# Patient Record
Sex: FEMALE | Race: WHITE | NOT HISPANIC OR LATINO | Employment: UNEMPLOYED | URBAN - METROPOLITAN AREA
[De-identification: names, ages, dates, MRNs, and addresses within clinical notes are randomized per-mention and may not be internally consistent; named-entity substitution may affect disease eponyms.]

---

## 2017-03-27 ENCOUNTER — TELEPHONE (OUTPATIENT)
Dept: FAMILY MEDICINE | Facility: OTHER | Age: 37
End: 2017-03-27

## 2017-03-27 NOTE — TELEPHONE ENCOUNTER
Summary:    Patient is due/failing the following:   FOLLOW UP, PAP and PHQ9    Action needed:   Patient needs office visit for follow up and PAP. and Patient needs to do PHQ9.    Type of outreach:    Phone, left message for patient to call back.     Questions for provider review:    None                                                                                                                                    Marima Purcell       Chart routed to Care Team .        Panel Management Review      Patient has the following on her problem list:     Depression / Dysthymia review  PHQ-9 SCORE 10/15/2013 1/9/2014 4/25/2016   Total Score 12 12 -   Total Score - - 8      Patient is due for:  PHQ9 and DAP      Composite cancer screening  Chart review shows that this patient is due/due soon for the following Pap Smear

## 2017-04-03 NOTE — PROGRESS NOTES
"  SUBJECTIVE:                                                    Angela Crawford is a 37 year old female who presents to clinic today for the following health issues:  Pt states only taking ibuprofen and multivitamin, please delete other meds if appropriate    Thinks she should seek help in regards to PSTD  - With having two disabled children  - Son's palliative care recommended Lexapro, mom is on it and likes it   - He also recommended benzo for post-hospitalization time, Valium in the past for CT/MRI scans has helped with claustrophobia   - PCAs and full time nurse       History of Present Illness   Depression & Anxiety Follow-up:     Depression/Anxiety:  Depression & Anxiety    Status since last visit::  Stable    Other associated symptoms of depression and anxiety::  YES    Significant life event::  No    Current substance use::  None  - Dizzy on Cymbalta, once tapered of went away plus wanted to feel like what it would be off it, also sort of zombie effect, also effected sex drive   - Fibro pain seemed better since off     Problem list and histories reviewed & adjusted, as indicated.  Additional history: as documented    ROS:  Constitutional, HEENT, cardiovascular, pulmonary, gi and gu systems are negative, except as otherwise noted.    OBJECTIVE:                                                    /88 (BP Location: Right arm, Patient Position: Chair, Cuff Size: Adult Regular)  Pulse 95  Temp 98.6  F (37  C) (Oral)  Resp 16  Ht 5' 4.76\" (1.645 m)  Wt 239 lb (108.4 kg)  SpO2 100%  BMI 40.06 kg/m2  There is no height or weight on file to calculate BMI.  GENERAL APPEARANCE: healty, alert and no distress  EYES: Eyes grossly normal to inspection, PERRLA, conjunctivae and sclerae without injection or discharge, EOM intact   PSYCH: Alert and oriented x3; speech- coherent , normal rate and volume; able to articulate logical thoughts, able to abstract reason, no tangential thoughts, no hallucinations or " delusions, mentation appears normal, Mood is euthymic. Affect is appropriate for this mood state and bright. Thought content is free of suicidal ideation, hallucinations, and delusions. Dress is adequate and upkept. Eye contact is good during conversation.       Diagnostic Test Results:  None      ASSESSMENT/PLAN:                                                        ICD-10-CM    1. Major depressive disorder, recurrent episode, moderate (H) F33.1 DEPRESSION ACTION PLAN (DAP) Order [[id number]]     escitalopram (LEXAPRO) 20 MG tablet     MENTAL HEALTH REFERRAL     ALPRAZolam (XANAX) 0.25 MG tablet     - Discussed SSRI/SNRI  - Will start Lexapro, discussed use and side effects including taper   - Will give short supply of Xanax, discussed only use for panic attacks, discussed side effects including sedation and addiction, will give #20 with no refills, discussed bring bottle to appointments so we can see how often is using   - Referral to counseling placed   - Recheck 1-2 months     The patient indicates understanding of these issues and agrees with the plan.    Follow up: 1-2 months         Dana Kong PA-C  [facility]

## 2017-04-04 ENCOUNTER — OFFICE VISIT (OUTPATIENT)
Dept: FAMILY MEDICINE | Facility: OTHER | Age: 37
End: 2017-04-04
Payer: COMMERCIAL

## 2017-04-04 VITALS
RESPIRATION RATE: 16 BRPM | TEMPERATURE: 98.6 F | OXYGEN SATURATION: 100 % | HEART RATE: 95 BPM | WEIGHT: 239 LBS | SYSTOLIC BLOOD PRESSURE: 122 MMHG | BODY MASS INDEX: 39.82 KG/M2 | HEIGHT: 65 IN | DIASTOLIC BLOOD PRESSURE: 88 MMHG

## 2017-04-04 DIAGNOSIS — F33.1 MAJOR DEPRESSIVE DISORDER, RECURRENT EPISODE, MODERATE (H): Primary | ICD-10-CM

## 2017-04-04 PROBLEM — Z30.013 ENCOUNTER FOR INITIAL PRESCRIPTION OF INJECTABLE CONTRACEPTIVE: Status: ACTIVE | Noted: 2017-04-04

## 2017-04-04 PROCEDURE — 99214 OFFICE O/P EST MOD 30 MIN: CPT | Performed by: PHYSICIAN ASSISTANT

## 2017-04-04 RX ORDER — ESCITALOPRAM OXALATE 20 MG/1
10 TABLET ORAL DAILY
Qty: 30 TABLET | Refills: 1 | Status: SHIPPED | OUTPATIENT
Start: 2017-04-04 | End: 2017-07-11

## 2017-04-04 RX ORDER — MEDROXYPROGESTERONE ACETATE 150 MG/ML
150 INJECTION, SUSPENSION INTRAMUSCULAR
Qty: 3 ML | Refills: 3 | Status: CANCELLED | OUTPATIENT
Start: 2017-04-04

## 2017-04-04 RX ORDER — ALPRAZOLAM 0.25 MG
0.25 TABLET ORAL 3 TIMES DAILY PRN
Qty: 20 TABLET | Refills: 0 | Status: SHIPPED | OUTPATIENT
Start: 2017-04-04 | End: 2017-12-29

## 2017-04-04 ASSESSMENT — ANXIETY QUESTIONNAIRES
GAD7 TOTAL SCORE: 19
7. FEELING AFRAID AS IF SOMETHING AWFUL MIGHT HAPPEN: 3 = NEARLY EVERY DAY

## 2017-04-04 ASSESSMENT — PAIN SCALES - GENERAL: PAINLEVEL: NO PAIN (0)

## 2017-04-04 NOTE — MR AVS SNAPSHOT
After Visit Summary   4/4/2017    Angela Crawford    MRN: [id number]           Patient Information     Date Of Birth          1980        Visit Information        Provider Department      4/4/2017 2:30 PM Dana Kong PA-C [facility]        Today's Diagnoses     Major depressive disorder, recurrent episode, moderate (H)    -  1    Need for prophylactic vaccination with tetanus-diphtheria (TD)          Care Instructions    - Recheck 1-2 months         Follow-ups after your visit        Additional Services     MENTAL HEALTH REFERRAL       Your provider has referred you to: PREFERRED PROVIDERS:  [facility]: [facility] - Counseling (Individual/Couples/Family) - [facility] [phone]   [email or web address]/   *Please call to schedule an appointment.    All scheduling is subject to the client's specific insurance plan & benefits, provider/location availability, and provider clinical specialities.  Please arrive 15 minutes early for your first appointment and bring your completed paperwork.    Please be aware that coverage of these services is subject to the terms and limitations of your health insurance plan.  Call member services at your health plan with any benefit or coverage questions.                  Who to contact     If you have questions or need follow up information about today's clinic visit or your schedule please contact [facility] directly at [phone].  Normal or non-critical lab and imaging results will be communicated to you by MyChart, letter or phone within 4 business days after the clinic has received the results. If you do not hear from us within 7 days, please contact the clinic through MyChart or phone. If you have a critical or abnormal lab result, we will notify you by phone as soon as possible.  Submit refill requests through [employer] or call your  "pharmacy and they will forward the refill request to us. Please allow 3 business days for your refill to be completed.          Additional Information About Your Visit        [employer] Information     [employer] gives you secure access to your electronic health record. If you see a primary care provider, you can also send messages to your care team and make appointments. If you have questions, please call your primary care clinic.  If you do not have a primary care provider, please call [phone] and they will assist you.        Care EveryWhere ID     This is your Care EveryWhere ID. This could be used by other organizations to access your [facility] medical records  [id number]        Your Vitals Were     Pulse Temperature Respirations Height Pulse Oximetry BMI (Body Mass Index)    95 98.6  F (37  C) (Oral) 16 5' 4.76\" (1.645 m) 100% 40.06 kg/m2       Blood Pressure from Last 3 Encounters:   04/04/17 122/88   04/25/16 108/80   04/22/16 102/77    Weight from Last 3 Encounters:   04/04/17 239 lb (108.4 kg)   04/25/16 245 lb 12.8 oz (111.5 kg)   03/16/15 253 lb (114.8 kg)              We Performed the Following     DEPRESSION ACTION PLAN (DAP) Order [[id number]]     MENTAL HEALTH REFERRAL          Today's Medication Changes          These changes are accurate as of: 4/4/17  3:06 PM.  If you have any questions, ask your nurse or doctor.               Start taking these medicines.        Dose/Directions    ALPRAZolam 0.25 MG tablet   Commonly known as:  XANAX   Used for:  Major depressive disorder, recurrent episode, moderate (H)   Started by:  Dana Kong PA-C        Dose:  0.25 mg   Take 1 tablet (0.25 mg) by mouth 3 times daily as needed for anxiety   Quantity:  20 tablet   Refills:  0       escitalopram 20 MG tablet   Commonly known as:  LEXAPRO   Used for:  Major depressive disorder, recurrent episode, moderate (H)   Started by:  Dana Kong PA-C        Dose:  10 mg   Take 0.5 " tablets (10 mg) by mouth daily For 1-2 weeks, then can increase to full tablet (20 mg) once daily by mouth.   Quantity:  30 tablet   Refills:  1         Stop taking these medicines if you haven't already. Please contact your care team if you have questions.     CYMBALTA PO   Stopped by:  Dana Kong PA-C                Where to get your medicines      These medications were sent to [place] [address] [facility] [place] - [address]  [address] [facility], [place] [address]     Phone:  [phone]     escitalopram 20 MG tablet         Some of these will need a paper prescription and others can be bought over the counter.  Ask your nurse if you have questions.     Bring a paper prescription for each of these medications     ALPRAZolam 0.25 MG tablet                Primary Care Provider Office Phone # Fax #    Dana Kong PA-C [phone]       [facility] [address]  [facility] [address]        Thank you!     Thank you for choosing [facility]  for your care. Our goal is always to provide you with excellent care. Hearing back from our patients is one way we can continue to improve our services. Please take a few minutes to complete the written survey that you may receive in the mail after your visit with us. Thank you!             Your Updated Medication List - Protect others around you: Learn how to safely use, store and throw away your medicines at www.disposemymeds.org.          This list is accurate as of: 4/4/17  3:06 PM.  Always use your most recent med list.                   Brand Name Dispense Instructions for use    ALPRAZolam 0.25 MG tablet    XANAX    20 tablet    Take 1 tablet (0.25 mg) by mouth 3 times daily as needed for anxiety       escitalopram 20 MG tablet    LEXAPRO    30 tablet    Take 0.5 tablets (10 mg) by mouth daily For 1-2 weeks, then can increase to full tablet (20 mg) once daily by mouth.       IBUPROFEN       as needed Reported on 4/4/2017       MULTIVITAMIN PO      Take  by mouth.

## 2017-04-04 NOTE — LETTER
My Depression Action Plan  Name: Angela Crawford   Date of Birth 1980  Date: 4/3/2017    [facility]   My clinic: [facility]  [address]  [place] [address] [phone]          GREEN    ZONE   Good Control    What it looks like:     Things are going generally well. You have normal up s and down s. You may even feel depressed from time to time, but bad moods usually last less than a day.   What you need to do:  1. Continue to care for yourself (see self care plan)  2. Check your depression survival kit and update it as needed  3. Follow your physician s recommendations including any medication.  4. Do not stop taking medication unless you consult with your physician first.           YELLOW         ZONE Getting Worse    What it looks like:     Depression is starting to interfere with your life.     It may be hard to get out of bed; you may be starting to isolate yourself from others.    Symptoms of depression are starting to last most all day and this has happened for several days.     You may have suicidal thoughts but they are not constant.   What you need to do:     1. Call your care team, your response to treatment will improve if you keep your care team informed of your progress. Yellow periods are signs an adjustment may need to be made.     2. Continue your self-care, even if you have to fake it!    3. Talk to someone in your support network    4. Open up your depression survival kit           RED    ZONE Medical Alert - Get Help    What it looks like:     Depression is seriously interfering with your life.     You may experience these or other symptoms: You can t get out of bed most days, can t work or engage in other necessary activities, you have trouble taking care of basic hygiene, or basic responsibilities, thoughts of suicide or death that will not go away, self-injurious behavior.     What you need to do:  1. Call your care  team and request a same-day appointment. If they are not available (weekends or after hours) call your local crisis line, emergency room or 911.      Electronically signed by: Carmita Freeman, April 3, 2017    Depression Self Care Plan / Survival Kit    Self-Care for Depression  Here s the deal. Your body and mind are really not as separate as most people think.  What you do and think affects how you feel and how you feel influences what you do and think. This means if you do things that people who feel good do, it will help you feel better.  Sometimes this is all it takes.  There is also a place for medication and therapy depending on how severe your depression is, so be sure to consult with your medical provider and/ or Behavioral Health Consultant if your symptoms are worsening or not improving.     In order to better manage my stress, I will:    Exercise  Get some form of exercise, every day. This will help reduce pain and release endorphins, the  feel good  chemicals in your brain. This is almost as good as taking antidepressants!  This is not the same as joining a gym and then never going! (they count on that by the way ) It can be as simple as just going for a walk or doing some gardening, anything that will get you moving.      Hygiene   Maintain good hygiene (Get out of bed in the morning, Make your bed, Brush your teeth, Take a shower, and Get dressed like you were going to work, even if you are unemployed).  If your clothes don't fit try to get ones that do.    Diet  I will strive to eat foods that are good for me, drink plenty of water, and avoid excessive sugar, caffeine, alcohol, and other mood-altering substances.  Some foods that are helpful in depression are: complex carbohydrates, B vitamins, flaxseed, fish or fish oil, fresh fruits and vegetables.    Psychotherapy  I agree to participate in Individual Therapy (if recommended).    Medication  If prescribed medications, I agree to take them.   Missing doses can result in serious side effects.  I understand that drinking alcohol, or other illicit drug use, may cause potential side effects.  I will not stop my medication abruptly without first discussing it with my provider.    Staying Connected With Others  I will stay in touch with my friends, family members, and my primary care provider/team.    Use your imagination  Be creative.  We all have a creative side; it doesn t matter if it s oil painting, sand castles, or mud pies! This will also kick up the endorphins.    Witness Beauty  (AKA stop and smell the roses) Take a look outside, even in mid-winter. Notice colors, textures. Watch the squirrels and birds.     Service to others  Be of service to others.  There is always someone else in need.  By helping others we can  get out of ourselves  and remember the really important things.  This also provides opportunities for practicing all the other parts of the program.    Humor  Laugh and be silly!  Adjust your TV habits for less news and crime-drama and more comedy.    Control your stress  Try breathing deep, massage therapy, biofeedback, and meditation. Find time to relax each day.     My support system    Clinic Contact:  Phone number:    Contact 1:  Phone number:    Contact 2:  Phone number:    Tenriism/:  Phone number:    Therapist:  Phone number:    Local crisis center:    Phone number:    Other community support:  Phone number:

## 2017-04-04 NOTE — NURSING NOTE
"Chief Complaint   Patient presents with     Depression     restart meds     Anxiety     Panel Management     mendy, height, tdap, pap, dap, phq9       Initial /88 (BP Location: Right arm, Patient Position: Chair, Cuff Size: Adult Regular)  Pulse 95  Temp 98.6  F (37  C) (Oral)  Resp 16  Ht 5' 4.76\" (1.645 m)  Wt 239 lb (108.4 kg)  SpO2 100%  BMI 40.06 kg/m2 Estimated body mass index is 40.06 kg/(m^2) as calculated from the following:    Height as of this encounter: 5' 4.76\" (1.645 m).    Weight as of this encounter: 239 lb (108.4 kg).  Medication Reconciliation: complete  "

## 2017-07-11 ENCOUNTER — TELEPHONE (OUTPATIENT)
Dept: FAMILY MEDICINE | Facility: OTHER | Age: 37
End: 2017-07-11

## 2017-07-11 DIAGNOSIS — F33.1 MAJOR DEPRESSIVE DISORDER, RECURRENT EPISODE, MODERATE (H): ICD-10-CM

## 2017-07-11 NOTE — TELEPHONE ENCOUNTER
escitalopram (LEXAPRO) 20 MG tablet     Last Written Prescription Date: 4/4/17  Last Fill Quantity: 30, # refills: 1  Last Office Visit with G primary care provider:  4/4/17        Last PHQ-9 score on record=   PHQ-9 SCORE 4/4/2017   Total Score -   Total Score MyChart 17 (Moderately severe depression)   Total Score -

## 2017-07-13 RX ORDER — ESCITALOPRAM OXALATE 20 MG/1
20 TABLET ORAL DAILY
Qty: 30 TABLET | Refills: 0 | Status: SHIPPED | OUTPATIENT
Start: 2017-07-13 | End: 2017-10-20

## 2017-07-14 NOTE — TELEPHONE ENCOUNTER
Per last ov was to follow up in 1-2 months.  Now due .  One month given.    Please call and help schedule.      Paramjit Lopez RN, BSN

## 2017-07-14 NOTE — TELEPHONE ENCOUNTER
Left message asking pt to return our call.  Please inform her of the message below and assist in scheduling.  Denis Zarate, CMA

## 2017-07-17 NOTE — TELEPHONE ENCOUNTER
Contacted pt and let her know of below.  Will call back later today to schedule.  Carmita Freeman, CMA

## 2017-07-19 ENCOUNTER — TELEPHONE (OUTPATIENT)
Dept: FAMILY MEDICINE | Facility: OTHER | Age: 37
End: 2017-07-19

## 2017-07-19 NOTE — LETTER
[address]   [facility] [address]  Phone: [phone]  August 1, 2017      Angela Crawford  [address] [facility] [address]      Dear Angela,    We care about your health and have reviewed your health plan including your medical conditions, medications, and lab results.  Based on this review, it is recommended that you follow up regarding the following health topic(s):  -Depression  -Cervical Cancer Screening    We recommend you take the following action(s):  -schedule a PAP SMEAR EXAM which is due.  Please disregard this reminder if you have had this exam elsewhere within the last year.  It would be helpful for us to have a copy of your recent pap smear report to update your records.  -Complete and return the attached PHQ-9 Form.  If your total score is greater than 9, please schedule a followup appointment.  If you answer Yes to question 9, call your clinic between the hours of 8 to 5.  You may also call the Suicide Hotline at 2-550-212-JNYX (5911) any time.     Please call us at the [facility] - [phone] (or use [employer]) to address the above recommendations.     Thank you for trusting [facility] and we appreciate the opportunity to serve you.  We look forward to supporting your healthcare needs in the future.    Healthy Regards,    Your Health Care Team  [facility]

## 2017-07-19 NOTE — TELEPHONE ENCOUNTER
Summary:    Patient is due/failing the following:   FOLLOW UP, PAP and PHQ9    Action needed:   Patient needs office visit for follow up and PAP. and Patient needs to do PHQ9.    Type of outreach:    Phone, left message for patient to call back.     Questions for provider review:    None                                                                                                                                    Mariam Purcell     Chart routed to Care Team .        Panel Management Review      Patient has the following on her problem list:     Depression / Dysthymia review  PHQ-9 SCORE 1/9/2014 4/25/2016 4/4/2017   Total Score 12 - -   Total Score MyChart - - 17 (Moderately severe depression)   Total Score - 8 -      Patient is due for:  PHQ9        Composite cancer screening  Chart review shows that this patient is due/due soon for the following Pap Smear

## 2017-09-07 ENCOUNTER — TELEPHONE (OUTPATIENT)
Dept: FAMILY MEDICINE | Facility: OTHER | Age: 37
End: 2017-09-07

## 2017-09-07 NOTE — TELEPHONE ENCOUNTER
Summary:    Patient is due/failing the following:   FOLLOW UP, PAP and PHQ9    Action needed:   Patient needs office visit for PAP . and Patient needs to do PHQ9.    Type of outreach:    Phone, left message for patient to call back.     Questions for provider review:    None                                                                                                                                    Mariam Purcell     Chart routed to Care Team .        Panel Management Review      Patient has the following on her problem list:     Depression / Dysthymia review  PHQ-9 SCORE 1/9/2014 4/25/2016 4/4/2017   Total Score 12 - -   Total Score MyChart - - 17 (Moderately severe depression)   Total Score - 8 -      Patient is due for:  PHQ9        Composite cancer screening  Chart review shows that this patient is due/due soon for the following Pap Smear

## 2017-10-20 DIAGNOSIS — F33.1 MAJOR DEPRESSIVE DISORDER, RECURRENT EPISODE, MODERATE (H): ICD-10-CM

## 2017-10-20 RX ORDER — ESCITALOPRAM OXALATE 20 MG/1
20 TABLET ORAL DAILY
Qty: 30 TABLET | Refills: 5 | Status: SHIPPED | OUTPATIENT
Start: 2017-10-20 | End: 2018-12-18

## 2017-10-20 NOTE — TELEPHONE ENCOUNTER
Lexapro  Last Written Prescription Date: 7/13/17  Last Fill Quantity: 30, # refills: 0  Last Office Visit with [facility] primary care provider:  4/4/17   Next 5 appointments (look out 90 days)     Oct 26, 2017  2:30 PM CDT   [facility]-C   [facility]    [address]  [place] [id number] [phone]                   Last PHQ-9 score on record=   PHQ-9 SCORE 4/4/2017   Total Score -   Total Score MyChart 17 (Moderately severe depression)   Total Score -

## 2017-10-20 NOTE — TELEPHONE ENCOUNTER
Reason for Call:  Medication or medication refill:    Do you use a [facility]?  Name of the pharmacy and phone number for the current request:  [facility]     Name of the medication requested: escitalopram (LEXAPRO) 20 MG tablet    Other request: Pt only has 2 left. Needs this done today. Has an appt next week.     Can we leave a detailed message on this number? YES    Phone number patient can be reached at: Home number on file [phone] (home)    Best Time: any     Call taken on 10/20/2017 at 12:27 PM by Heide Godwin

## 2017-10-20 NOTE — TELEPHONE ENCOUNTER
Routing to DP as PCP is out.    Out of RN scope to fill---greater than one month break in medication cycle. Per EPIC, last filled in July for 30 day supply.  Overdue for follow up, but it looks like she's scheduled for next week.    Maximino Cameron, RN, BSN

## 2017-10-22 ENCOUNTER — HEALTH MAINTENANCE LETTER (OUTPATIENT)
Age: 37
End: 2017-10-22

## 2017-10-26 PROBLEM — Z30.42 ENCOUNTER FOR SURVEILLANCE OF INJECTABLE CONTRACEPTIVE: Status: ACTIVE | Noted: 2017-04-04

## 2017-11-03 ENCOUNTER — TELEPHONE (OUTPATIENT)
Dept: FAMILY MEDICINE | Facility: OTHER | Age: 37
End: 2017-11-03

## 2017-11-03 NOTE — LETTER
[address]   [facility] [address]  Phone: [phone]  November 30, 2017      Angela Crawford  [address] [address] [facility] [address]      Dear Angela,    We care about your health and have reviewed your health plan including your medical conditions, medications, and lab results.  Based on this review, it is recommended that you follow up regarding the following health topic(s):  -Depression  -Cervical Cancer Screening    We recommend you take the following action(s):  -schedule a FOLLOWUP APPOINTMENT.  -schedule a PAP SMEAR EXAM which is due.  Please disregard this reminder if you have had this exam elsewhere within the last year.  It would be helpful for us to have a copy of your recent pap smear report to update your records.  -Complete and return the attached PHQ-9 Form.  If your total score is greater than 9, please schedule a followup appointment.  If you answer Yes to question 9, call your clinic between the hours of 8 to 5.  You may also call the Suicide Hotline at 4-565-542-QLGB (5387) any time.     Please call us at the [facility] - [phone] (or use [employer]) to address the above recommendations.     Thank you for trusting [facility] and we appreciate the opportunity to serve you.  We look forward to supporting your healthcare needs in the future.    Healthy Regards,    Your Health Care Team  [facility]

## 2017-11-03 NOTE — TELEPHONE ENCOUNTER
Summary:    Patient is due/failing the following:   FOLLOW UP, PAP and PHQ9    Action needed:   Patient needs office visit for PAP and follow up. and Patient needs to do PHQ9.    Type of outreach:    Phone, left message for patient to call back.     Questions for provider review:    None                                                                                                                                    Mariam Hannamag       Chart routed to Care Team .      Panel Management Review      Patient has the following on her problem list:     Depression / Dysthymia review    Measure:  Needs PHQ-9 score of 4 or less during index window.  Administer PHQ-9 and if score is 5 or more, send encounter to provider for next steps.        PHQ-9 SCORE 1/9/2014 4/25/2016 4/4/2017   Total Score 12 - -   Total Score MyChart - - 17 (Moderately severe depression)   Total Score - 8 -       If PHQ-9 recheck is 5 or more, route to provider for next steps.    Patient is due for:  PHQ9        Composite cancer screening  Chart review shows that this patient is due/due soon for the following Pap Smear

## 2017-12-21 ENCOUNTER — MYC MEDICAL ADVICE (OUTPATIENT)
Dept: FAMILY MEDICINE | Facility: OTHER | Age: 37
End: 2017-12-21

## 2017-12-21 NOTE — TELEPHONE ENCOUNTER
Letter written and placed in MA task.    Rodger Kong PA-C  [facility]

## 2017-12-21 NOTE — TELEPHONE ENCOUNTER
LM for patient that letter has been placed at  for .  Clementina Ryan CMA ([facility])

## 2017-12-21 NOTE — LETTER
[address]   [facility] [address]  Phone: [phone]    December 21, 2017        Angela Crawford  [address] [address] [facility] [address]          To whom it may concern:    RE: Angela Crawford is a patient of mine and is currently unable to keep up with the demand of household chores, heavy lifting, shoveling, and deep cleaning needed to ensure a healthy home for her disabled son Hemant Crawford. Angela has been diagnosed with the following:   Patient Active Problem List    Diagnosis Date Noted     Encounter for surveillance of injectable contraceptive 04/04/2017     Priority: Medium     Fibromyalgia syndrome 08/13/2013     Priority: Medium     Moderate major depression (H) 05/02/2013     Priority: Medium     Headache 05/02/2013     Priority: Medium     Problem list name updated by automated process. 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 Medical/therapeutic appointments, planned/emergency hospitalizations, and numerous county/school meetings also keep her temporarily absent from the home and [employer] would be needed in her absence. Please consider approving [employer] for Angela and her family.     Please feel free to contact me with any questions or concerns.     Sincerely,                   Dana Kong PA-C

## 2017-12-29 ENCOUNTER — MYC REFILL (OUTPATIENT)
Dept: FAMILY MEDICINE | Facility: OTHER | Age: 37
End: 2017-12-29

## 2017-12-29 DIAGNOSIS — F33.1 MAJOR DEPRESSIVE DISORDER, RECURRENT EPISODE, MODERATE (H): ICD-10-CM

## 2017-12-29 RX ORDER — ALPRAZOLAM 0.25 MG
0.25 TABLET ORAL 3 TIMES DAILY PRN
Qty: 5 TABLET | Refills: 0 | Status: SHIPPED | OUTPATIENT
Start: 2017-12-29 | End: 2018-12-18

## 2017-12-29 NOTE — TELEPHONE ENCOUNTER
Message from [employer]:  Original authorizing provider: VIPUL Martins would like a refill of the following medications:  ALPRAZolam (XANAX) 0.25 MG tablet [Dana Kong PA-C]    Preferred pharmacy: [facility] [id number] [place] [facility] [place] [place] - [address] [address] [place] [address]    Comment:  1 is needed every few weeks for severe anxiety & PTSD symptoms related to medically fragile sons disability. If an appt is needed for a renewal, please let me know asap so I can get it scheduled, as my son will be having surgery next week on Wed. at [facility] and will be in the hospital or recovering at home until Jan. 15th. Thank you, Angela Crawford [phone]

## 2017-12-29 NOTE — TELEPHONE ENCOUNTER
Appointment required as is a controlled substance. Will give very small jose r refill.   Last fill was 4/4/17 for #20. I have not seen patient since then.     Rx signed and placed in MA task.     Rodger Kong PA-C  [facility]

## 2018-02-15 ENCOUNTER — TELEPHONE (OUTPATIENT)
Dept: FAMILY MEDICINE | Facility: OTHER | Age: 38
End: 2018-02-15

## 2018-02-15 NOTE — LETTER
[address]   [facility] [address]  Phone: [phone]  March 22, 2018      Angela Crawford  [address] [address] [facility] [address]      Dear Angela,    We care about your health and have reviewed your health plan including your medical conditions, medications, and lab results.  Based on this review, it is recommended that you follow up regarding the following health topic(s):  -Depression  -Cervical Cancer Screening    We recommend you take the following action(s):  -schedule a FOLLOWUP APPOINTMENT.  -schedule a PAP SMEAR EXAM which is due.  Please disregard this reminder if you have had this exam elsewhere within the last year.  It would be helpful for us to have a copy of your recent pap smear report to update your records.  -Complete and return the attached PHQ-9 Form.  If your total score is greater than 9, please schedule a followup appointment.  If you answer Yes to question 9, call your clinic between the hours of 8 to 5.  You may also call the Suicide Hotline at 6-849-156-GLBT (6725) any time.     Please call us at the [facility] - [phone] (or use [employer]) to address the above recommendations.     Thank you for trusting [facility]] and we appreciate the opportunity to serve you.  We look forward to supporting your healthcare needs in the future.    Healthy Regards,    Your Health Care Team  [facility]

## 2018-02-15 NOTE — TELEPHONE ENCOUNTER
Summary:    Patient is due/failing the following:   FOLLOW UP, PAP and PHQ9    Action needed:   Patient needs office visit for PAP and follow up . and Patient needs to do PHQ9.    Type of outreach:    Phone, left message for patient to call back.     Questions for provider review:    None                                                                                                                                    Mariam Hannamag     Chart routed to Care Team .        Panel Management Review      Patient has the following on her problem list:     Depression / Dysthymia review    Measure:  Needs PHQ-9 score of 4 or less during index window.  Administer PHQ-9 and if score is 5 or more, send encounter to provider for next steps.        PHQ-9 SCORE 1/9/2014 4/25/2016 4/4/2017   Total Score 12 - -   Total Score MyChart - - 17 (Moderately severe depression)   Total Score - 8 -       If PHQ-9 recheck is 5 or more, route to provider for next steps.    Patient is due for:  PHQ9      Composite cancer screening  Chart review shows that this patient is due/due soon for the following Pap Smear

## 2018-08-02 DIAGNOSIS — F33.1 MAJOR DEPRESSIVE DISORDER, RECURRENT EPISODE, MODERATE (H): ICD-10-CM

## 2018-08-02 RX ORDER — ESCITALOPRAM OXALATE 20 MG/1
TABLET ORAL
Qty: 30 TABLET | Refills: 5 | OUTPATIENT
Start: 2018-08-02

## 2018-08-02 NOTE — TELEPHONE ENCOUNTER
Lexapro   Last Written Prescription Date:  4/4/17  Last Fill Quantity: 30,   # refills: 5  Last Office Visit: 4/4/17  Future Office visit:       - Last visit 4/4/17 - was told to recheck 1-2 months     - Given jose r refill 7/11/17    RX denied, needs appointment   Please let patient know options for office, phone, or E-visit.     Rodger Kong PA-C  [facility]

## 2018-08-02 NOTE — TELEPHONE ENCOUNTER
Escitalopram    PHQ-9 SCORE 1/9/2014 4/25/2016 4/4/2017   Total Score 12 - -   Total Score MyChart - - 17 (Moderately severe depression)   Total Score - 8 -     Routing refill request to provider for review/approval because:  Labs not current:  PHQ9  LOV 4/4/2017    Sada Mak RN, BSN

## 2018-08-02 NOTE — TELEPHONE ENCOUNTER
Left message for patient to call back. Please see message below.   Please help schedule appointment or see if she is being seen at a different clinic.  Miri Baca, CMA

## 2018-08-02 NOTE — LETTER
[address]  [facility] [address]  [phone]        August 6, 2018    Angela Crawford  [address] [facility] [address]          Dear Angela,    Your request for a refill of Lexapro has been denied as you have not been seen in over 1 year. Please either call the clinic to set up a phone or office visit, or you can request an e-visit through [employer]. Let us know if you have any questions or concerns.     Sincerely,        Your [facility] Care Team

## 2018-08-03 NOTE — TELEPHONE ENCOUNTER
Tried to contact patient but phone was busy, will try again later.  Clementina Ryan CMA ([facility])

## 2018-10-01 ENCOUNTER — APPOINTMENT (OUTPATIENT)
Dept: FAMILY MEDICINE | Facility: OTHER | Age: 38
End: 2018-10-01
Payer: COMMERCIAL

## 2018-12-13 NOTE — PROGRESS NOTES
SUBJECTIVE:   Angela Crawford is a 38 year old female who presents to clinic today for the following health issues:    HPI  Depression and Anxiety Follow-Up    Status since last visit: up and down    Other associated symptoms: None    Complicating factors:     Significant life event: Yes-  Spine surgery for son      Current substance abuse: none    - Medication denied August 2018   - Last seen 4/4/17   - Needs letter for for county     Has fibromyalgia, back pain, and depression, needs help caring for 2 disabled sons   - Stress times when in  in hospital, shuts down and can't control joint and muscle pain   - Living with pain so long, is it normal? Prevents from doing normal things   - Taking 10 mg helps   - DDD in low back known from many many years back       PHQ 4/25/2016 12/18/2018   PHQ-9 Total Score 8 10   Q9: Suicide Ideation Not at all Not at all     CLAUDIA-7 SCORE 1/9/2014 4/4/2017 12/18/2018   Total Score 8 - -   Total Score - 19 (severe anxiety) 8 (mild anxiety)   Total Score - - 8     In the past two weeks have you had thoughts of suicide or self-harm?  No.    Do you have concerns about your personal safety or the safety of others?   No    - Last seen 4/4/17    - Discussed SSRI/SNRI  - Will start Lexapro, discussed use and side effects including taper   - Will give short supply of Xanax, discussed only use for panic attacks, discussed side effects including sedation and addiction, will give #20 with no refills, discussed bring bottle to appointments so we can see how often is using   - Referral to counseling placed   - Recheck 1-2 months       Problem list and histories reviewed & adjusted, as indicated.  Additional history: as documented    ROS:  Constitutional, HEENT, cardiovascular, pulmonary, gi and gu systems are negative, except as otherwise noted.    OBJECTIVE:   /86   Pulse 94   Temp 97.7  F (36.5  C) (Temporal)   Resp 16   Wt 108.7 kg (239 lb 9.6 oz)   SpO2 99%   BMI 40.16 kg/m     Body mass index is 40.16 kg/m .  GENERAL APPEARANCE: healthy, alert and no distress  EYES: Eyes grossly normal to inspection, PERRLA, conjunctivae and sclerae without injection or discharge, EOM intact   MS: No musculoskeletal defects are noted and gait is age appropriate without ataxia   SKIN: No suspicious lesions or rashes, hydration status appears adeuqate with normal skin turgor   BACK: decreased rom with all movements, no midline tenderness, bilateral paracervical tenderness, normal lower extremity exam   PSYCH: Alert and oriented x3; speech- coherent , normal rate and volume; able to articulate logical thoughts, able to abstract reason, no tangential thoughts, no hallucinations or delusions, mentation appears normal, Mood is euthymic. Affect is appropriate for this mood state and bright. Thought content is free of suicidal ideation, hallucinations, and delusions. Dress is adequate and upkept. Eye contact is good during conversation.       Diagnostic Test Results:  none     ASSESSMENT/PLAN:       ICD-10-CM    1. Moderate major depression (H) F32.1 escitalopram (LEXAPRO) 20 MG tablet   2. Fibromyalgia syndrome M79.7 PAIN MANAGEMENT REFERRAL   3. DDD (degenerative disc disease), lumbar M51.36 MR Lumbar Spine w/o Contrast     diazepam (VALIUM) 10 MG tablet     NEUROSURGERY REFERRAL     PAIN MANAGEMENT REFERRAL   4. Morbid obesity (H) E66.01      1. Depression   - Discussed Lexapro works better with continued use   - Patient in agreement   - Will restart at 20 mg, reviewed use and side effects, refilled      2. Fibromyalgia   - Patient with many questions about physical therapy/chiropractic and possibly medication management for this   - Interested in comprehensive care   - Referred to [facility] in [place]     3. Low back pain   - Per patient known DDD, worsening over the past few years   - Recommend getting MRI lumbar spine since we have no record of her last MRI      Needs open sided MRI with Valium, reviewed  use and side effects, must take 30-60 min before MRI and needs a    - Then follow up after MRI with neurosurgery to discuss options     4. Advised weight loss and exercise to help with both fibromyalgia pain and low back pain     The patient indicates understanding of these issues and agrees with the plan.    Follow up: as outlined above, 1 year or PRN for mood recheck       Dana Kong PA-C  [facility]

## 2018-12-18 ENCOUNTER — OFFICE VISIT (OUTPATIENT)
Dept: FAMILY MEDICINE | Facility: OTHER | Age: 38
End: 2018-12-18
Payer: COMMERCIAL

## 2018-12-18 VITALS
DIASTOLIC BLOOD PRESSURE: 86 MMHG | HEART RATE: 94 BPM | SYSTOLIC BLOOD PRESSURE: 122 MMHG | WEIGHT: 239.6 LBS | OXYGEN SATURATION: 99 % | RESPIRATION RATE: 16 BRPM | TEMPERATURE: 97.7 F | BODY MASS INDEX: 40.16 KG/M2

## 2018-12-18 DIAGNOSIS — M79.7 FIBROMYALGIA SYNDROME: ICD-10-CM

## 2018-12-18 DIAGNOSIS — M51.369 DDD (DEGENERATIVE DISC DISEASE), LUMBAR: ICD-10-CM

## 2018-12-18 DIAGNOSIS — F32.1 MODERATE MAJOR DEPRESSION (H): Primary | ICD-10-CM

## 2018-12-18 DIAGNOSIS — E66.01 MORBID OBESITY (H): ICD-10-CM

## 2018-12-18 PROBLEM — Z30.42 ENCOUNTER FOR SURVEILLANCE OF INJECTABLE CONTRACEPTIVE: Status: RESOLVED | Noted: 2017-04-04 | Resolved: 2018-12-18

## 2018-12-18 PROCEDURE — 99214 OFFICE O/P EST MOD 30 MIN: CPT | Performed by: PHYSICIAN ASSISTANT

## 2018-12-18 RX ORDER — DIAZEPAM 10 MG
10 TABLET ORAL EVERY 6 HOURS PRN
Qty: 1 TABLET | Refills: 0 | Status: SHIPPED | OUTPATIENT
Start: 2018-12-18 | End: 2019-01-29

## 2018-12-18 RX ORDER — ESCITALOPRAM OXALATE 20 MG/1
10 TABLET ORAL DAILY
Qty: 45 TABLET | Refills: 3 | Status: SHIPPED | OUTPATIENT
Start: 2018-12-18 | End: 2020-02-03

## 2018-12-18 SDOH — HEALTH STABILITY: MENTAL HEALTH: HOW OFTEN DO YOU HAVE A DRINK CONTAINING ALCOHOL?: NEVER

## 2018-12-18 ASSESSMENT — ANXIETY QUESTIONNAIRES
7. FEELING AFRAID AS IF SOMETHING AWFUL MIGHT HAPPEN: SEVERAL DAYS
GAD7 TOTAL SCORE: 8
4. TROUBLE RELAXING: SEVERAL DAYS
5. BEING SO RESTLESS THAT IT IS HARD TO SIT STILL: MORE THAN HALF THE DAYS
2. NOT BEING ABLE TO STOP OR CONTROL WORRYING: SEVERAL DAYS
GAD7 TOTAL SCORE: 8
6. BECOMING EASILY ANNOYED OR IRRITABLE: SEVERAL DAYS
3. WORRYING TOO MUCH ABOUT DIFFERENT THINGS: SEVERAL DAYS
GAD7 TOTAL SCORE: 8
1. FEELING NERVOUS, ANXIOUS, OR ON EDGE: SEVERAL DAYS

## 2018-12-18 ASSESSMENT — PATIENT HEALTH QUESTIONNAIRE - PHQ9
10. IF YOU CHECKED OFF ANY PROBLEMS, HOW DIFFICULT HAVE THESE PROBLEMS MADE IT FOR YOU TO DO YOUR WORK, TAKE CARE OF THINGS AT HOME, OR GET ALONG WITH OTHER PEOPLE: NOT DIFFICULT AT ALL
SUM OF ALL RESPONSES TO PHQ QUESTIONS 1-9: 10
SUM OF ALL RESPONSES TO PHQ QUESTIONS 1-9: 10

## 2018-12-18 ASSESSMENT — PAIN SCALES - GENERAL: PAINLEVEL: EXTREME PAIN (8)

## 2018-12-18 NOTE — PATIENT INSTRUCTIONS
1. Appointment with [facility] for pain    2. MRI of low back       Then follow up with neurosurgery 3-4 days or more after MRI     [facility] [phone]

## 2018-12-18 NOTE — LETTER
[address]   [facility] [address]  Phone: [phone]      December 21, 2017        RE:   Angela Crawford  [address] [address] [facility] [address]        To whom it may concern:     Angela Crawford is a patient of mine and is currently unable to keep up with the demand of household chores, heavy lifting, shoveling, and deep cleaning needed to ensure a healthy home for her disabled son Hemant Crawford. Angela has been diagnosed with the following: depression, fibromyalgia, and back pain.       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 Medical/therapeutic appointments, planned/emergency hospitalizations, and numerous county/school meetings also keep her temporarily absent from the home and [employer] would be needed in her absence. Please consider approving [employer] for Angela and her family.     Please feel free to contact me with any questions or concerns.     Sincerely,                           Dana Kong PA-C

## 2018-12-19 ASSESSMENT — ANXIETY QUESTIONNAIRES: GAD7 TOTAL SCORE: 8

## 2018-12-19 ASSESSMENT — PATIENT HEALTH QUESTIONNAIRE - PHQ9: SUM OF ALL RESPONSES TO PHQ QUESTIONS 1-9: 10

## 2019-01-29 ENCOUNTER — TELEPHONE (OUTPATIENT)
Dept: FAMILY MEDICINE | Facility: OTHER | Age: 39
End: 2019-01-29

## 2019-01-29 ENCOUNTER — OFFICE VISIT (OUTPATIENT)
Dept: FAMILY MEDICINE | Facility: OTHER | Age: 39
End: 2019-01-29
Payer: COMMERCIAL

## 2019-01-29 ENCOUNTER — ANCILLARY PROCEDURE (OUTPATIENT)
Dept: GENERAL RADIOLOGY | Facility: OTHER | Age: 39
End: 2019-01-29
Payer: COMMERCIAL

## 2019-01-29 VITALS
HEART RATE: 112 BPM | BODY MASS INDEX: 40.23 KG/M2 | DIASTOLIC BLOOD PRESSURE: 72 MMHG | SYSTOLIC BLOOD PRESSURE: 118 MMHG | OXYGEN SATURATION: 97 % | WEIGHT: 240 LBS | RESPIRATION RATE: 16 BRPM | TEMPERATURE: 100.3 F

## 2019-01-29 DIAGNOSIS — R05.9 COUGH: ICD-10-CM

## 2019-01-29 DIAGNOSIS — R50.9 FEVER, UNSPECIFIED FEVER CAUSE: ICD-10-CM

## 2019-01-29 DIAGNOSIS — J18.9 PNEUMONIA OF RIGHT LOWER LOBE DUE TO INFECTIOUS ORGANISM: Primary | ICD-10-CM

## 2019-01-29 LAB
DEPRECATED S PYO AG THROAT QL EIA: NORMAL
ERYTHROCYTE [DISTWIDTH] IN BLOOD BY AUTOMATED COUNT: 13.7 % (ref 10–15)
FLUAV+FLUBV AG SPEC QL: NEGATIVE
FLUAV+FLUBV AG SPEC QL: NEGATIVE
HCT VFR BLD AUTO: 35.3 % (ref 35–47)
HGB BLD-MCNC: 12.1 G/DL (ref 11.7–15.7)
MCH RBC QN AUTO: 30 PG (ref 26.5–33)
MCHC RBC AUTO-ENTMCNC: 34.3 G/DL (ref 31.5–36.5)
MCV RBC AUTO: 88 FL (ref 78–100)
PLATELET # BLD AUTO: 294 10E9/L (ref 150–450)
RBC # BLD AUTO: 4.03 10E12/L (ref 3.8–5.2)
SPECIMEN SOURCE: NORMAL
SPECIMEN SOURCE: NORMAL
WBC # BLD AUTO: 10.7 10E9/L (ref 4–11)

## 2019-01-29 PROCEDURE — 87081 CULTURE SCREEN ONLY: CPT | Performed by: FAMILY MEDICINE

## 2019-01-29 PROCEDURE — 87880 STREP A ASSAY W/OPTIC: CPT | Performed by: FAMILY MEDICINE

## 2019-01-29 PROCEDURE — 36415 COLL VENOUS BLD VENIPUNCTURE: CPT | Performed by: FAMILY MEDICINE

## 2019-01-29 PROCEDURE — 85027 COMPLETE CBC AUTOMATED: CPT | Performed by: FAMILY MEDICINE

## 2019-01-29 PROCEDURE — 99214 OFFICE O/P EST MOD 30 MIN: CPT | Performed by: FAMILY MEDICINE

## 2019-01-29 PROCEDURE — 71046 X-RAY EXAM CHEST 2 VIEWS: CPT

## 2019-01-29 PROCEDURE — 87804 INFLUENZA ASSAY W/OPTIC: CPT | Performed by: FAMILY MEDICINE

## 2019-01-29 RX ORDER — AZITHROMYCIN 250 MG/1
TABLET, FILM COATED ORAL
Qty: 6 TABLET | Refills: 0 | Status: SHIPPED | OUTPATIENT
Start: 2019-01-29 | End: 2019-10-01

## 2019-01-29 RX ORDER — ALBUTEROL SULFATE 0.83 MG/ML
2.5 SOLUTION RESPIRATORY (INHALATION) EVERY 6 HOURS PRN
Qty: 1 BOX | Refills: 0 | Status: SHIPPED | OUTPATIENT
Start: 2019-01-29 | End: 2021-03-24

## 2019-01-29 NOTE — TELEPHONE ENCOUNTER
I would be willing to see this patient today.  OK to put in at 3:20 slot, but if she wants to come at 1, I would see her then.

## 2019-01-29 NOTE — TELEPHONE ENCOUNTER
Reason for Call:  Same Day Appointment, Requested Provider:  anyone     PCP: Dana Kong    Reason for visit: strep    Duration of symptoms: over the weekend    Have you been treated for this in the past? No    Additional comments: I offered patient a appt in [place], she declined. She prefers [place]    Can we leave a detailed message on this number? NO    Phone number patient can be reached at: [phone]     Best Time:     Call taken on 1/29/2019 at 9:08 AM by Bette Tucker

## 2019-01-29 NOTE — PROGRESS NOTES
"  SUBJECTIVE:   Angela Crawford is a 39 year old female who presents to clinic today for the following health issues:      HPI  Acute Illness   Acute illness concerns: strep?  Onset: 5 days    Fever: YES    Chills/Sweats: YES    Headache (location?): YES    Sinus Pressure:YES    Conjunctivitis:  no    Ear Pain: no    Rhinorrhea: YES    Congestion: YES    Sore Throat: YES     Cough: YES-productive of yellow sputum, productive of green sputum, with shortness of breath    Wheeze: YES    Decreased Appetite: YES    Nausea: YES    Vomiting: YES    Diarrhea:  no    Dysuria/Freq.: no    Fatigue/Achiness: YES    Sick/Strep Exposure: YES- son     Therapies Tried and outcome: Nyquil/Dayquil    Started with headache, then developed pressure in her face, nasal congestion, cough started later that night, then developed the body aches.  Coughing has been bad and makes her whole body hurt.  The last couple days has had night sweats and \"kitten purr\" on right side when breathing.  Using a family member's albuterol and Pulmicort which did help and gave her a couple hours relief.  Coughs hard enough that she vomits.  Cough productive of yellow/green phlegm.  She is concerned that she seems to be the person who is always ill at home.    Problem list and histories reviewed & adjusted, as indicated.  Additional history: as documented    Patient Active Problem List   Diagnosis     Moderate major depression (H)     DDD (degenerative disc disease), lumbar     Fibromyalgia syndrome     Morbid obesity (H)     Past Surgical History:   Procedure Laterality Date      SECTION      x3       Social History     Tobacco Use     Smoking status: Former Smoker     Smokeless tobacco: Never Used   Substance Use Topics     Alcohol use: No     Frequency: Never     Comment: no alcohol in 5 weeks     Family History   Problem Relation Age of Onset     Hypertension Father      Lipids Father      Depression Mother      Depression Brother      Cancer Sister  "        hodkins lymphoma      Lipids Brother      Asthma No family hx of      C.A.D. No family hx of      Diabetes No family hx of      Cerebrovascular Disease No family hx of      Breast Cancer No family hx of      Cancer - colorectal No family hx of      Prostate Cancer No family hx of      Alcohol/Drug No family hx of      Allergies No family hx of      Alzheimer Disease No family hx of      Anesthesia Reaction No family hx of      Arthritis No family hx of      Blood Disease No family hx of      Cardiovascular No family hx of      Circulatory No family hx of      Congenital Anomalies No family hx of      Connective Tissue Disorder No family hx of      Endocrine Disease No family hx of      Eye Disorder No family hx of      Genetic Disorder No family hx of      Gastrointestinal Disease No family hx of      Genitourinary Problems No family hx of      Gynecology No family hx of      Heart Disease No family hx of      Musculoskeletal Disorder No family hx of      Neurologic Disorder No family hx of      Osteoporosis No family hx of      Hearing Loss No family hx of      Thyroid Disease No family hx of      Respiratory No family hx of      Psychotic Disorder No family hx of      Obesity No family hx of            OBJECTIVE:     /72 (BP Location: Right arm, Patient Position: Chair, Cuff Size: Adult Large)   Pulse 112   Temp 100.3  F (37.9  C) (Temporal)   Resp 16   Wt 108.9 kg (240 lb)   SpO2 97%   BMI 40.23 kg/m    Body mass index is 40.23 kg/m .  Gen: no apparent distress  HENT: Ears normal. Throat and pharynx normal. Neck supple. No adenopathy or masses in the neck or supraclavicular regions. Sinuses non tender.   Chest: rales posterior right about 1/2 way up, intermittent wheeze, left side clear  Cor: regular rate and rhythm without murmur  Ext: warm and dry without edema  Psych: Alert and oriented times 3; coherent speech, normal   rate and volume, able to articulate logical thoughts, able   to  abstract reason, no tangential thoughts, no hallucinations   or delusions  Her affect is neutral.     Diagnostic Test Results:  Results for orders placed or performed in visit on 01/29/19 (from the past 24 hour(s))   Strep, Rapid Screen   Result Value Ref Range    Specimen Description Throat     Rapid Strep A Screen       NEGATIVE: No Group A streptococcal antigen detected by immunoassay, await culture report.   Influenza A/B antigen   Result Value Ref Range    Influenza A/B Agn Specimen Nasopharyngeal     Influenza A Negative NEG^Negative    Influenza B Negative NEG^Negative   CBC with platelets   Result Value Ref Range    WBC 10.7 4.0 - 11.0 10e9/L    RBC Count 4.03 3.8 - 5.2 10e12/L    Hemoglobin 12.1 11.7 - 15.7 g/dL    Hematocrit 35.3 35.0 - 47.0 %    MCV 88 78 - 100 fl    MCH 30.0 26.5 - 33.0 pg    MCHC 34.3 31.5 - 36.5 g/dL    RDW 13.7 10.0 - 15.0 %    Platelet Count 294 150 - 450 10e9/L       CXR: RLL pneumonia, Radiology review pending.    ASSESSMENT/PLAN:       ICD-10-CM    1. Pneumonia of right lower lobe due to infectious organism (H) J18.1 Strep, Rapid Screen     Influenza A/B antigen     Beta strep group A culture     azithromycin (ZITHROMAX) 250 MG tablet     albuterol (PROVENTIL) (2.5 MG/3ML) 0.083% neb solution   2. Fever, unspecified fever cause R50.9 Influenza A/B antigen     Beta strep group A culture     XR Chest 2 Views     CBC with platelets   3. Cough R05 Influenza A/B antigen     Beta strep group A culture     XR Chest 2 Views     CBC with platelets     She has pneumonia, had negative influenza.  Discussed that there are other virsues as well as bacteria that can cause pneumonia.  Will treat with Zithromax and albuterol.  Return if not improving.  She has concerns about having frequent infections, encouraged her to follow up with PCP after pneumonia has resolved to discuss this further.    Katie Berger MD  [facility]

## 2019-01-29 NOTE — TELEPHONE ENCOUNTER
Next 5 appointments (look out 90 days)    Jan 29, 2019  3:20 PM CST  [facility] [address]  [facility] [address] [phone]        Patient coming at 1.     Terri Younger, RN, BSN

## 2019-01-30 LAB
BACTERIA SPEC CULT: NORMAL
SPECIMEN SOURCE: NORMAL

## 2019-04-08 ENCOUNTER — TELEPHONE (OUTPATIENT)
Dept: FAMILY MEDICINE | Facility: OTHER | Age: 39
End: 2019-04-08

## 2019-04-08 NOTE — TELEPHONE ENCOUNTER
Summary:    Patient is due/failing the following:   PAP and PHYSICAL    Action needed:   Patient needs office visit for physical and PAP.    Type of outreach:    Phone, left message for patient to call back.     Questions for provider review:    None                                                                                                                                    Mariam Binh     Chart routed to Care Team .      Panel Management Review      Patient has the following on her problem list:     Depression / Dysthymia review    Measure:  Needs PHQ-9 score of 4 or less during index window.  Administer PHQ-9 and if score is 5 or more, send encounter to provider for next steps.      PHQ-9 SCORE 4/25/2016 4/4/2017 12/18/2018   PHQ-9 Total Score - - -   PHQ-9 Total Score MyChart - 17 (Moderately severe depression) 10 (Moderate depression)   PHQ-9 Total Score 8 - 10       If PHQ-9 recheck is 5 or more, route to provider for next steps.    Patient is due for:  PHQ9      Composite cancer screening  Chart review shows that this patient is due/due soon for the following Pap Smear

## 2019-10-01 ENCOUNTER — OFFICE VISIT (OUTPATIENT)
Dept: URGENT CARE | Facility: RETAIL CLINIC | Age: 39
End: 2019-10-01
Payer: COMMERCIAL

## 2019-10-01 VITALS — DIASTOLIC BLOOD PRESSURE: 82 MMHG | HEART RATE: 76 BPM | SYSTOLIC BLOOD PRESSURE: 142 MMHG | TEMPERATURE: 99.5 F

## 2019-10-01 DIAGNOSIS — H60.392 OTHER INFECTIVE ACUTE OTITIS EXTERNA OF LEFT EAR: Primary | ICD-10-CM

## 2019-10-01 PROCEDURE — 99213 OFFICE O/P EST LOW 20 MIN: CPT | Performed by: PHYSICIAN ASSISTANT

## 2019-10-01 RX ORDER — NEOMYCIN POLYMYXIN B SULFATES AND DEXAMETHASONE 3.5; 10000; 1 MG/ML; [USP'U]/ML; MG/ML
SUSPENSION/ DROPS OPHTHALMIC
Qty: 5 ML | Refills: 1 | Status: SHIPPED | OUTPATIENT
Start: 2019-10-01 | End: 2019-12-06

## 2019-10-01 ASSESSMENT — ENCOUNTER SYMPTOMS
SINUS PAIN: 0
SORE THROAT: 0
FATIGUE: 0
WHEEZING: 0
COUGH: 0
SINUS PRESSURE: 0
EYE REDNESS: 0
ACTIVITY CHANGE: 0
EYE DISCHARGE: 0
RHINORRHEA: 0
FEVER: 0
HEADACHES: 0
ADENOPATHY: 0
CHILLS: 0

## 2019-10-01 NOTE — PROGRESS NOTES
"Chief Complaint   Patient presents with     Otalgia     left ear pain x 1 day, ear feels completely sealed shut since yesterday, no fevers     SUBJECTIVE:  Angela Crawford is a 39 year old female who presents for evaluation of left ear pain and hearing loss for 1 day. \"I woke up yesterday morning with intense pain and it felt like it was completely sealed shut.\"  Severity: moderate   Timing: sudden onset  Additional symptoms include none.  Treatment measures tried include: ibuprofen yesterday, none today.  History of recurrent otitis: no  Predisposing factors include: None.    Past Medical History:   Diagnosis Date     Degenerative disc disease, lumbar      Fibromyalgia      Migraines      escitalopram (LEXAPRO) 20 MG tablet, Take 0.5 tablets (10 mg) by mouth daily  Multiple Vitamins-Minerals (MULTIVITAMIN OR), Take  by mouth.  albuterol (PROVENTIL) (2.5 MG/3ML) 0.083% neb solution, Take 1 vial (2.5 mg) by nebulization every 6 hours as needed for shortness of breath / dyspnea or wheezing (Patient not taking: Reported on 10/1/2019)  IBUPROFEN, as needed Reported on 4/4/2017    No current facility-administered medications on file prior to visit.     Social History     Tobacco Use     Smoking status: Former Smoker     Smokeless tobacco: Never Used   Substance Use Topics     Alcohol use: No     Frequency: Never     Comment: no alcohol in 5 weeks     Allergies   Allergen Reactions     Seasonal Allergies      Review of Systems   Constitutional: Negative for activity change, chills, fatigue and fever.   HENT: Positive for ear pain (left, painful and plugged). Negative for congestion, ear discharge, hearing loss, postnasal drip, rhinorrhea, sinus pressure, sinus pain and sore throat.    Eyes: Negative for discharge and redness.   Respiratory: Negative for cough and wheezing.    Neurological: Negative for headaches.   Hematological: Negative for adenopathy.     OBJECTIVE:  BP (!) 142/82 (BP Location: Left arm)   Pulse 76   " Temp 99.5  F (37.5  C) (Oral)    GENERAL: awake alert and in no acute distress  EARS:   Right TM in neutral position, translucent without scarring, effusion or erythema. Normal landmarks are visible. Auditory canal without drainage, edema, erythema.  Left TM in neutral position without effusion but with moderate erythema. Normal landmarks are not visible. Auditory canal with moderate edema, erythema. No discharge.  ENT: EOMI,  PERRL, conjunctiva clear. Nares bilaterally without edematous turbinates or discharge. Posterior pharynx is not erythematous.  NECK: supple, non-tender to palpation, no adenopathy noted.   RESP: lungs clear to auscultation - no rales, rhonchi or wheezes  CV: regular rates and rhythm, normal S1 S2, no murmur noted    ASSESSMENT:    ICD-10-CM    1. Other infective acute otitis externa of left ear H60.392 neomycin-polymixin-dexamethasone (MAXITROL) 0.1 % ophthalmic suspension     PLAN:   Patient Instructions   Neomycin-polymyxin-dexamethasone (Maxitrol) ophthalmic suspension 3 drops into ear 4 times daily for 7 days  Tylenol or ibuprofen as needed for pain relief.  No Qtips in ear canal. You may clean drainage from external ear with Qtip.  Keep water out of ear until the infection is cleared.  May swim with ear plug if not painful.  Avoid submerging head under water.    Afrin (oxymetazoline) nasal spray twice daily for 3 days. Stop after 3 days. Use before flight.        Follow up with primary care provider with any problems, questions or concerns or if symptoms worsen or fail to improve. Patient agreed to plan and verbalized understanding.    Olinda Daniels PA-C  [facility]

## 2019-10-01 NOTE — PATIENT INSTRUCTIONS
Neomycin-polymyxin-dexamethasone (Maxitrol) ophthalmic suspension 3 drops into ear 4 times daily for 7 days  Tylenol or ibuprofen as needed for pain relief.  No Qtips in ear canal. You may clean drainage from external ear with Qtip.  Keep water out of ear until the infection is cleared.  May swim with ear plug if not painful.  Avoid submerging head under water.    Afrin (oxymetazoline) nasal spray twice daily for 3 days. Stop after 3 days. Use before flight.

## 2019-11-14 ENCOUNTER — IMMUNIZATION (OUTPATIENT)
Dept: FAMILY MEDICINE | Facility: OTHER | Age: 39
End: 2019-11-14
Payer: COMMERCIAL

## 2019-11-14 PROCEDURE — 90471 IMMUNIZATION ADMIN: CPT

## 2019-11-14 PROCEDURE — 90686 IIV4 VACC NO PRSV 0.5 ML IM: CPT

## 2019-12-04 NOTE — PROGRESS NOTES
"Subjective     Angela Crawford is a 39 year old female who presents to clinic today for the following health issues:    Answers for HPI/ROS submitted by the patient on 12/6/2019   Chronic problems general questions HPI Form  If you checked off any problems, how difficult have these problems made it for you to do your work, take care of things at home, or get along with other people?: Very difficult  PHQ9 TOTAL SCORE: 13  CLAUDIA 7 TOTAL SCORE: 12  History of Present Illness        She eats 0-1 servings of fruits and vegetables daily.She consumes 0 sweetened beverage(s) daily.  She is taking medications regularly.     Acute Illness   Acute illness concerns: Congestion and coughing up phlegm  Onset: 2 months    Fever: YES    Chills/Sweats: YES    Headache (location?): no    Sinus Pressure:YES    Conjunctivitis:  no    Ear Pain: YES: bilateral    Rhinorrhea: YES    Congestion: YES    Sore Throat: YES     Cough: YES    Wheeze: YES    Decreased Appetite: YES    Nausea: YES    Vomiting: YES due to coughing    Diarrhea:  no    Dysuria/Freq.: no    Fatigue/Achiness: YES    Sick/Strep Exposure: No         Therapies Tried and outcome: Nebulizer and albuterol, day/nightquil    She also requests refills on her depression medications.  Stable symptoms.  No side effects.    Reviewed and updated as needed this visit by Provider       Review of Systems   ROS COMP: Constitutional, HEENT, cardiovascular, pulmonary, gi and gu systems are negative, except as otherwise noted.      Objective    /64   Pulse 94   Temp 98  F (36.7  C) (Temporal)   Resp 16   Ht 1.647 m (5' 4.84\")   Wt 110 kg (242 lb 8 oz)   LMP 11/15/2019 (Exact Date)   SpO2 96%   BMI 40.55 kg/m    Body mass index is 40.55 kg/m .  Physical Exam  Constitutional:       Appearance: She is well-developed.   HENT:      Head: Normocephalic and atraumatic.      Right Ear: External ear normal.      Left Ear: External ear normal.      Nose: Nose normal.      Mouth/Throat:      " "Pharynx: No oropharyngeal exudate.   Cardiovascular:      Rate and Rhythm: Normal rate and regular rhythm.      Pulses: Normal pulses.      Heart sounds: Normal heart sounds. No murmur. No friction rub. No gallop.    Pulmonary:      Effort: Pulmonary effort is normal. No respiratory distress.      Breath sounds: Wheezing present. No rales.   Chest:      Chest wall: No tenderness.   Skin:     Findings: No rash.   Neurological:      General: No focal deficit present.      Mental Status: She is oriented to person, place, and time.   Psychiatric:         Mood and Affect: Mood normal.         Behavior: Behavior normal.         Thought Content: Thought content normal.         Judgment: Judgment normal.                Assessment & Plan   Problem List Items Addressed This Visit     Moderate major depression (H)     Symptoms are moderately well-controlled on the current dose of Lexapro.  Patient wishes to continue the same dose.  Refill medicines.  Recheck in 6 months for follow-up.         Morbid obesity (H)      Other Visit Diagnoses     Acute bronchitis, unspecified organism    -  Primary         Symptoms consistent with bronchitis.  Will treat with doxycycline.  Symptomatic treatment with Tessalon Perles for cough.  Discussed home care  Reportable signs and symptoms discussed  RTC if symptoms persist or fail to improve        BMI:   Estimated body mass index is 40.55 kg/m  as calculated from the following:    Height as of this encounter: 1.647 m (5' 4.84\").    Weight as of this encounter: 110 kg (242 lb 8 oz).           See Patient Instructions  Return in about 2 weeks (around 12/20/2019).    Michelle Conroy MD  [facility]        "

## 2019-12-06 ENCOUNTER — OFFICE VISIT (OUTPATIENT)
Dept: FAMILY MEDICINE | Facility: OTHER | Age: 39
End: 2019-12-06
Payer: COMMERCIAL

## 2019-12-06 VITALS
HEIGHT: 65 IN | RESPIRATION RATE: 16 BRPM | BODY MASS INDEX: 40.4 KG/M2 | DIASTOLIC BLOOD PRESSURE: 64 MMHG | HEART RATE: 94 BPM | SYSTOLIC BLOOD PRESSURE: 106 MMHG | WEIGHT: 242.5 LBS | OXYGEN SATURATION: 96 % | TEMPERATURE: 98 F

## 2019-12-06 DIAGNOSIS — E66.01 MORBID OBESITY (H): ICD-10-CM

## 2019-12-06 DIAGNOSIS — F32.1 MODERATE MAJOR DEPRESSION (H): ICD-10-CM

## 2019-12-06 DIAGNOSIS — J20.9 ACUTE BRONCHITIS, UNSPECIFIED ORGANISM: Primary | ICD-10-CM

## 2019-12-06 PROCEDURE — 99214 OFFICE O/P EST MOD 30 MIN: CPT | Performed by: FAMILY MEDICINE

## 2019-12-06 RX ORDER — DOXYCYCLINE HYCLATE 100 MG
100 TABLET ORAL 2 TIMES DAILY
Qty: 20 TABLET | Refills: 0 | Status: SHIPPED | OUTPATIENT
Start: 2019-12-06 | End: 2020-02-19

## 2019-12-06 RX ORDER — BENZONATATE 100 MG/1
100 CAPSULE ORAL 3 TIMES DAILY PRN
Qty: 30 CAPSULE | Refills: 1 | Status: SHIPPED | OUTPATIENT
Start: 2019-12-06 | End: 2020-02-19

## 2019-12-06 ASSESSMENT — PATIENT HEALTH QUESTIONNAIRE - PHQ9
10. IF YOU CHECKED OFF ANY PROBLEMS, HOW DIFFICULT HAVE THESE PROBLEMS MADE IT FOR YOU TO DO YOUR WORK, TAKE CARE OF THINGS AT HOME, OR GET ALONG WITH OTHER PEOPLE: VERY DIFFICULT
SUM OF ALL RESPONSES TO PHQ QUESTIONS 1-9: 13
SUM OF ALL RESPONSES TO PHQ QUESTIONS 1-9: 13

## 2019-12-06 ASSESSMENT — ANXIETY QUESTIONNAIRES
GAD7 TOTAL SCORE: 12
2. NOT BEING ABLE TO STOP OR CONTROL WORRYING: MORE THAN HALF THE DAYS
3. WORRYING TOO MUCH ABOUT DIFFERENT THINGS: MORE THAN HALF THE DAYS
5. BEING SO RESTLESS THAT IT IS HARD TO SIT STILL: SEVERAL DAYS
GAD7 TOTAL SCORE: 12
6. BECOMING EASILY ANNOYED OR IRRITABLE: SEVERAL DAYS
7. FEELING AFRAID AS IF SOMETHING AWFUL MIGHT HAPPEN: NEARLY EVERY DAY
1. FEELING NERVOUS, ANXIOUS, OR ON EDGE: MORE THAN HALF THE DAYS
4. TROUBLE RELAXING: SEVERAL DAYS
7. FEELING AFRAID AS IF SOMETHING AWFUL MIGHT HAPPEN: NEARLY EVERY DAY
GAD7 TOTAL SCORE: 12

## 2019-12-06 ASSESSMENT — MIFFLIN-ST. JEOR: SCORE: 1763.35

## 2019-12-07 ASSESSMENT — PATIENT HEALTH QUESTIONNAIRE - PHQ9: SUM OF ALL RESPONSES TO PHQ QUESTIONS 1-9: 13

## 2019-12-07 ASSESSMENT — ANXIETY QUESTIONNAIRES: GAD7 TOTAL SCORE: 12

## 2019-12-27 ENCOUNTER — TELEPHONE (OUTPATIENT)
Dept: FAMILY MEDICINE | Facility: OTHER | Age: 39
End: 2019-12-27

## 2019-12-27 NOTE — TELEPHONE ENCOUNTER
Prior Authorization Retail Medication Request    Medication/Dose: Modafinil 200Mg   ICD code (if different than what is on RX):  Unknown   Previously Tried and Failed:  NA  Rationale:  treatment of shift work sleep disorder; obstructive sleep apnea/hypopnea syndrome ; narcolepsy.    Insurance Name:  [facility]  Insurance ID:  [id number]       Pharmacy Information (if different than what is on RX)  Name:  [facility]  Phone:  [phone]    Fax sent that PA for this Medication . Please call [phone] to renew the PA.   Radha Horta MA

## 2019-12-30 NOTE — TELEPHONE ENCOUNTER
Central Prior Authorization Team   Phone: [phone]      PA NOT NEEDED    Medication: Modafinil 200Mg -PA NOT NEEDED  Insurance Company:    Pharmacy Filling the Rx:    Filling Pharmacy Phone:    Filling Pharmacy Fax:    Start Date: 12/30/2019    There is no mention of this medication in this patient's chart besides this request.  Called the pharmacy to see if patient gets medication from a different provider, pharmacy does not have this medication on the patient's profile.

## 2020-01-05 NOTE — ASSESSMENT & PLAN NOTE
Symptoms are moderately well-controlled on the current dose of Lexapro.  Patient wishes to continue the same dose.  Refill medicines.  Recheck in 6 months for follow-up.

## 2020-01-06 ENCOUNTER — MYC MEDICAL ADVICE (OUTPATIENT)
Dept: FAMILY MEDICINE | Facility: OTHER | Age: 40
End: 2020-01-06

## 2020-01-06 NOTE — LETTER
[address]   [facility] [address]  Phone: [phone]    January 6, 2020        Angela Crawford  [address] [facility] [address]          To whom it may concern,    Angela Crawford is a patient of mine and is currently unable to keep up with the demand of household chores, heavy lifting, shoveling, and deep cleaning needed to ensure a healthy home for her disabled son Hemant Crawford. Angela has been diagnosed with fibromyalgia, degenerative disc disease, arthralgia, and moderate major depression. 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 Medical/therapeutic appointments, planned/emergency hospitalizations, and numerous county/school meetings also keep her temporarily absent from the home and chore/homemaker services would be needed in her absence. Please consider approving chore/homemaker services for Angela and her family.    Please feel free to contact me with any questions or concerns.      Sincerely,            Dana Kong PA-C   Chronic ulcer of right lower extremity  01/29/2018    Active  Kizzy Zayas

## 2020-01-07 NOTE — TELEPHONE ENCOUNTER
Patient has not read [employer] message yet.   Left message for patient to return call.   Irene Curtis MA

## 2020-02-03 ENCOUNTER — MYC REFILL (OUTPATIENT)
Dept: FAMILY MEDICINE | Facility: OTHER | Age: 40
End: 2020-02-03

## 2020-02-03 DIAGNOSIS — F32.1 MODERATE MAJOR DEPRESSION (H): ICD-10-CM

## 2020-02-05 RX ORDER — ESCITALOPRAM OXALATE 20 MG/1
10 TABLET ORAL DAILY
Qty: 45 TABLET | Refills: 1 | Status: SHIPPED | OUTPATIENT
Start: 2020-02-05 | End: 2020-03-11

## 2020-02-05 NOTE — TELEPHONE ENCOUNTER
Pending Prescriptions:                       Disp   Refills    escitalopram (LEXAPRO) 20 MG tablet        45 tab*3        Sig: Take 0.5 tablets (10 mg) by mouth daily    Routing refill request to provider for review/approval because:  Labs out of range:  PHQ9    PHQ-9 score:    PHQ-9 SCORE 1/6/2020   PHQ-9 Total Score -   PHQ-9 Total Score MyChart 15 (Moderately severe depression)   PHQ-9 Total Score 15       Monique Pond, BSN, RN, PHN

## 2020-02-05 NOTE — TELEPHONE ENCOUNTER
Was seen by RK 12/6/19. Estephania, ok for refills    Rodger Kong PA-C  [facility]

## 2020-02-08 ENCOUNTER — TELEPHONE (OUTPATIENT)
Dept: FAMILY MEDICINE | Facility: OTHER | Age: 40
End: 2020-02-08

## 2020-02-08 NOTE — TELEPHONE ENCOUNTER
Per outreach, patient is aware of overdue screening and will call on their own time for scheduling.     Screening: Preventive Health Screening Cervical/PAP and ReattributionPhysical    Thanks

## 2020-02-14 NOTE — TELEPHONE ENCOUNTER
I will Remove PA and watch for any discrepencies.   Or search if this was intended for another patient.   Radha Horta MA

## 2020-02-19 ENCOUNTER — HOSPITAL ENCOUNTER (OUTPATIENT)
Dept: CT IMAGING | Facility: CLINIC | Age: 40
Discharge: HOME | End: 2020-02-19
Attending: PHYSICIAN ASSISTANT | Admitting: PHYSICIAN ASSISTANT
Payer: COMMERCIAL

## 2020-02-19 ENCOUNTER — TELEPHONE (OUTPATIENT)
Dept: FAMILY MEDICINE | Facility: OTHER | Age: 40
End: 2020-02-19

## 2020-02-19 ENCOUNTER — ANCILLARY PROCEDURE (OUTPATIENT)
Dept: GENERAL RADIOLOGY | Facility: OTHER | Age: 40
End: 2020-02-19
Attending: PHYSICIAN ASSISTANT
Payer: COMMERCIAL

## 2020-02-19 ENCOUNTER — OFFICE VISIT (OUTPATIENT)
Dept: FAMILY MEDICINE | Facility: OTHER | Age: 40
End: 2020-02-19
Payer: COMMERCIAL

## 2020-02-19 VITALS
DIASTOLIC BLOOD PRESSURE: 86 MMHG | RESPIRATION RATE: 18 BRPM | WEIGHT: 250.8 LBS | BODY MASS INDEX: 41.79 KG/M2 | SYSTOLIC BLOOD PRESSURE: 116 MMHG | TEMPERATURE: 97.6 F | HEIGHT: 65 IN | OXYGEN SATURATION: 99 % | HEART RATE: 74 BPM

## 2020-02-19 DIAGNOSIS — H60.502 ACUTE OTITIS EXTERNA OF LEFT EAR, UNSPECIFIED TYPE: ICD-10-CM

## 2020-02-19 DIAGNOSIS — Z23 NEED FOR DIPHTHERIA-TETANUS-PERTUSSIS (TDAP) VACCINE: ICD-10-CM

## 2020-02-19 DIAGNOSIS — R07.9 LEFT-SIDED CHEST PAIN: ICD-10-CM

## 2020-02-19 DIAGNOSIS — R07.0 THROAT PAIN: Primary | ICD-10-CM

## 2020-02-19 DIAGNOSIS — R05.9 COUGH: ICD-10-CM

## 2020-02-19 PROCEDURE — 71046 X-RAY EXAM CHEST 2 VIEWS: CPT

## 2020-02-19 PROCEDURE — 40001204 ZZHCL STATISTIC STREP A RAPID: Performed by: PHYSICIAN ASSISTANT

## 2020-02-19 PROCEDURE — 99214 OFFICE O/P EST MOD 30 MIN: CPT | Mod: 25 | Performed by: PHYSICIAN ASSISTANT

## 2020-02-19 PROCEDURE — 71260 CT THORAX DX C+: CPT

## 2020-02-19 PROCEDURE — 93000 ELECTROCARDIOGRAM COMPLETE: CPT | Performed by: PHYSICIAN ASSISTANT

## 2020-02-19 PROCEDURE — 25000125 ZZHC RX 250: Performed by: RADIOLOGY

## 2020-02-19 PROCEDURE — 90471 IMMUNIZATION ADMIN: CPT | Performed by: PHYSICIAN ASSISTANT

## 2020-02-19 PROCEDURE — 90715 TDAP VACCINE 7 YRS/> IM: CPT | Performed by: PHYSICIAN ASSISTANT

## 2020-02-19 PROCEDURE — 25000128 H RX IP 250 OP 636: Performed by: RADIOLOGY

## 2020-02-19 PROCEDURE — 87651 STREP A DNA AMP PROBE: CPT | Performed by: PHYSICIAN ASSISTANT

## 2020-02-19 RX ORDER — IOPAMIDOL 755 MG/ML
500 INJECTION, SOLUTION INTRAVASCULAR ONCE
Status: COMPLETED | OUTPATIENT
Start: 2020-02-19 | End: 2020-02-19

## 2020-02-19 RX ORDER — NEOMYCIN SULFATE, POLYMYXIN B SULFATE, HYDROCORTISONE 3.5; 10000; 1 MG/ML; [USP'U]/ML; MG/ML
3 SOLUTION/ DROPS AURICULAR (OTIC) 4 TIMES DAILY
Qty: 5 ML | Refills: 0 | Status: SHIPPED | OUTPATIENT
Start: 2020-02-19 | End: 2020-03-25

## 2020-02-19 RX ADMIN — SODIUM CHLORIDE 75 ML: 9 INJECTION, SOLUTION INTRAVENOUS at 13:04

## 2020-02-19 RX ADMIN — IOPAMIDOL 75 ML: 755 INJECTION, SOLUTION INTRAVENOUS at 13:04

## 2020-02-19 ASSESSMENT — ANXIETY QUESTIONNAIRES
5. BEING SO RESTLESS THAT IT IS HARD TO SIT STILL: SEVERAL DAYS
1. FEELING NERVOUS, ANXIOUS, OR ON EDGE: MORE THAN HALF THE DAYS
GAD7 TOTAL SCORE: 8
2. NOT BEING ABLE TO STOP OR CONTROL WORRYING: SEVERAL DAYS
4. TROUBLE RELAXING: MORE THAN HALF THE DAYS
3. WORRYING TOO MUCH ABOUT DIFFERENT THINGS: SEVERAL DAYS
GAD7 TOTAL SCORE: 8
6. BECOMING EASILY ANNOYED OR IRRITABLE: NOT AT ALL
GAD7 TOTAL SCORE: 8
7. FEELING AFRAID AS IF SOMETHING AWFUL MIGHT HAPPEN: SEVERAL DAYS
7. FEELING AFRAID AS IF SOMETHING AWFUL MIGHT HAPPEN: SEVERAL DAYS

## 2020-02-19 ASSESSMENT — MIFFLIN-ST. JEOR: SCORE: 1791.62

## 2020-02-19 ASSESSMENT — PATIENT HEALTH QUESTIONNAIRE - PHQ9
SUM OF ALL RESPONSES TO PHQ QUESTIONS 1-9: 11
10. IF YOU CHECKED OFF ANY PROBLEMS, HOW DIFFICULT HAVE THESE PROBLEMS MADE IT FOR YOU TO DO YOUR WORK, TAKE CARE OF THINGS AT HOME, OR GET ALONG WITH OTHER PEOPLE: SOMEWHAT DIFFICULT
SUM OF ALL RESPONSES TO PHQ QUESTIONS 1-9: 11

## 2020-02-19 NOTE — TELEPHONE ENCOUNTER
Called to perform [id number], waiting on call back to discuss case.   Order Number/Authorization Number: [id number]  Valid through March 2020.     Felice Arellano PA-C

## 2020-02-19 NOTE — TELEPHONE ENCOUNTER
----- Message from Magui Patton sent at 2/19/2020  1:58 PM CST -----  Regarding: denied authorization requesting peer to peer review  Dr. Arellano,    The authorization for procedure CT CHEST W CONTRAST on date of service 2/19/2020 has been denied. We were unsuccessful in obtaining approval through clinical review. A [id number] review can be done by calling the insurance/third party authorization vendor with the following information:    Insurance: [employer]  Auth Vendor:  [employer]  Phone:  [phone]  Due:  2/21/2020    Patient ID: [id number]  Case/Ref #: N/A    Denial Reason: denied based on not meeting medical necessity with info provided.     Please complete this as soon as you are able and let me know when it is done.    Thank you,    Magui  [facility] [place]

## 2020-02-19 NOTE — NURSING NOTE
Prior to immunization administration, verified patients identity using patient s name and date of birth. Please see Immunization Activity for additional information.     Screening Questionnaire for Adult Immunization    Are you sick today?   Yes   Do you have allergies to medications, food, a vaccine component or latex?   No   Have you ever had a serious reaction after receiving a vaccination?   No   Do you have a long-term health problem with heart, lung, kidney, or metabolic disease (e.g., diabetes), asthma, a blood disorder, no spleen, complement component deficiency, a cochlear implant, or a spinal fluid leak?  Are you on long-term aspirin therapy?   No   Do you have cancer, leukemia, HIV/AIDS, or any other immune system problem?   No   Do you have a parent, brother, or sister with an immune system problem?   No   In the past 3 months, have you taken medications that affect  your immune system, such as prednisone, other steroids, or anticancer drugs; drugs for the treatment of rheumatoid arthritis, Crohn s disease, or psoriasis; or have you had radiation treatments?   No   Have you had a seizure, or a brain or other nervous system problem?   No   During the past year, have you received a transfusion of blood or blood    products, or been given immune (gamma) globulin or antiviral drug?   No   For women: Are you pregnant or is there a chance you could become       pregnant during the next month?   No   Have you received any vaccinations in the past 4 weeks?   No     Immunization questionnaire was positive for at least one answer.  Notified ES.        Per orders of ES, injection of Tdap given by Irene Curtis MA. Patient instructed to remain in clinic for 15 minutes afterwards, and to report any adverse reaction to me immediately.       Screening performed by Irene Curtis MA on 2/19/2020 at 12:01 PM.

## 2020-02-19 NOTE — PROGRESS NOTES
Subjective     Angela Crawford is a 40 year old female who presents to clinic today for the following health issues:    History of Present Illness        She eats 4 or more servings of fruits and vegetables daily.She consumes 0 sweetened beverage(s) daily.She exercises with enough effort to increase her heart rate 20 to 29 minutes per day.  She exercises with enough effort to increase her heart rate 3 or less days per week.   She is taking medications regularly.     Answers for HPI/ROS submitted by the patient on 2/19/2020   Chronic problems general questions HPI Form  If you checked off any problems, how difficult have these problems made it for you to do your work, take care of things at home, or get along with other people?: Somewhat difficult  PHQ9 TOTAL SCORE: 11  CLAUDIA 7 TOTAL SCORE: 8      Acute Illness   Acute illness concerns: sneezing, coughing, ear pain  Onset: since October with an ear thing, she says she hasn't even gotten up to 80%. Not getting better. Over the last week it has gotten worse.     Fever: no    Chills/Sweats: YES- waking up with sweats.     Headache (location?): no    Sinus Pressure: YES    Conjunctivitis:  no    Ear Pain: YES: left, feels like a cyst. Had the same feeling in October.     Rhinorrhea: YES    Congestion: YES    Sore Throat: YES- scratchy throat     Cough: YES-productive of clear sputum. Lots of reflux and heart pressure.    Wheeze: YES- sounds like a crackle or a cat purring on her left side of chest and sometimes in her throat.     Decreased Appetite: no    Nausea: YES- a little.     Vomiting: no    Diarrhea:  no    Dysuria/Freq.: no    Fatigue/Achiness: YES- fatigue is more than normal. Consistent pain in left stomach    Sick/Strep Exposure: no     Therapies Tried and outcome: albuterol nebulizer as needed, dayquil everyday      Cough has persisted since October, it is random and coughing up clear sputum.   Still having green mucous from nose.    Nose is still runny and  "pressure.   Now having more itchy throat recently.   Woke up today with the \"same exact thing in October\" with her left ear.   No fevers during any of this.   Now has more noises just on the left side of the chest when she lays flat. They sound like crackles or purring.   She occasionally will feel like her heart will skip a beat but no racing heart symptoms.     Has pressure in the left side chest all the time.  This has been going on for 2-3 weeks. Can be worse with activity and cold weather.  Sometimes has \"tinges\" into the arm and jaw on left side.   Has had persistent pain in left upper abdomen for years, hears popping and gurgling but not increased gas or bowel movement changes.   No headaches or vision changes.  No paresthesias.  No peripheral edema or claudication.   No difficulty swallowing.    No trauma to the chest.   No personal or family history of clotting disorders.        Current Outpatient Medications   Medication Sig Dispense Refill     escitalopram (LEXAPRO) 20 MG tablet Take 0.5 tablets (10 mg) by mouth daily 45 tablet 1     IBUPROFEN as needed Reported on 4/4/2017       Multiple Vitamins-Minerals (MULTIVITAMIN OR) Take  by mouth.       neomycin-polymyxin-hydrocortisone 3.5-40689-6 OT otic solution Place 3 drops Into the left ear 4 times daily for 7 days 5 mL 0     albuterol (PROVENTIL) (2.5 MG/3ML) 0.083% neb solution Take 1 vial (2.5 mg) by nebulization every 6 hours as needed for shortness of breath / dyspnea or wheezing 1 Box 0     Allergies   Allergen Reactions     Seasonal Allergies        Reviewed and updated as needed this visit by Provider  Tobacco  Allergies  Meds  Problems  Med Hx  Surg Hx  Fam Hx         Review of Systems   ROS COMP: Constitutional, HEENT, cardiovascular, pulmonary, GI, , musculoskeletal, neuro, skin, endocrine and psych systems are negative, except as otherwise noted.      Objective    /86   Pulse 74   Temp 97.6  F (36.4  C) (Temporal)   Resp 18   " " 5' 4.57\" (1.64 m)   Wt 250 lb 12.8 oz (113.8 kg)   LMP 02/10/2020 (Exact Date)   SpO2 99%   BMI 42.30 kg/m    Body mass index is 42.3 kg/m .  Physical Exam   GENERAL: healthy, alert and no distress  EYES: Eyes grossly normal to inspection, PERRL and conjunctivae and sclerae normal  HENT: normal cephalic/atraumatic, right ear: normal: no effusions, no erythema, normal landmarks, left ear: TM appears normal, EAC is erythematous and edematous without discharge, nose and mouth without ulcers or lesions, oropharynx clear and oral mucous membranes moist  NECK: no adenopathy, no asymmetry, masses, or scars and thyroid normal to palpation  RESP: lungs clear to auscultation - no rales, rhonchi or wheezes  CV: regular rate and rhythm, normal S1 S2, no S3 or S4, no murmur, click or rub, no peripheral edema and peripheral pulses strong  ABDOMEN: bowel sounds normal and soft, non-distended, non-tender to palpation in all quadrants, no abdominal rigidity.   MS: normal muscle tone, tenderness to palpation over the inferior 4 ribs from left flank to the mid axillary region on left without palpable masses or crepitus.   SKIN: no suspicious lesions or rashes  PSYCH: mentation appears normal, affect normal/bright    Diagnostic Test Results:  EKG: NSR    Labs reviewed in Epic  Results for orders placed or performed in visit on 02/19/20 (from the past 24 hour(s))   Streptococcus A Rapid Scr w Reflx to PCR   Result Value Ref Range    Strep Specimen Description Throat     Streptococcus Group A Rapid Screen Negative NEG^Negative       CHEST TWO VIEWS  2/19/2020 11:54 AM      HISTORY: Left-sided chest pain. Cough.     COMPARISON: 1/29/2019     FINDINGS: Heart size and pulmonary vascularity are within normal  limits. The lungs are clear. No pneumothorax or pleural effusion.                                                                       IMPRESSION: No radiographic evidence of acute chest abnormality.      BRAD MONTGOMERY MD    "     Assessment & Plan       ICD-10-CM    1. Throat pain R07.0 Streptococcus A Rapid Scr w Reflx to PCR     Group A Streptococcus PCR Throat Swab   2. Left-sided chest pain R07.9 XR Chest 2 Views     EKG 12-lead complete w/read - Clinics     CT Chest w Contrast     Echocardiogram Exercise Stress     CBC with platelets differential     Comprehensive metabolic panel     Troponin I     TSH with free T4 reflex     CANCELED: TSH with free T4 reflex     CANCELED: CBC with platelets differential     CANCELED: Troponin I     CANCELED: Comprehensive metabolic panel   3. Cough R05 XR Chest 2 Views     CT Chest w Contrast     Echocardiogram Exercise Stress   4. Need for diphtheria-tetanus-pertussis (Tdap) vaccine Z23 TDAP VACCINE (ADACEL)          ADMIN VACCINE, FIRST   5. Acute otitis externa of left ear, unspecified type H60.502 neomycin-polymyxin-hydrocortisone 3.5-26817-8 OT otic solution          Ear:  - Otitis externa present without discharged.  Started on Cortisporin drops to help with inflammation and potential infection.  No signs of otitis media.     Left side chest pain/cough:  Differential diagnosis includes but not limited to the following - pneumonia, bronchitis, pleurisy, pleural effusion, lung mass, obstructive process, pericarditis, myocardial ischemia, pulmonary embolism, GERD, gastritis, musculoskeletal, PUD, colitis  - X-ray was negative for any immediate concerns.   - Obtained EKG to assess immediately for any arrhythmias, signs of enlargement of heart, signs of previous ischemia or active ischemia.   - No symptoms or exam findings to suggest deep venous thrombosis or pulmonary embolism, she has no significant risk factors.   - MI cannot be entirely ruled out by EKG alone, would recommend stress testing to rule out cardiac etiology.   - No history of lung disease, CT was negative for signs of infection including infection of heart, no masses of the chest wall or lungs, no signs of lymphadenopathy.   -  Recommend patient come in for future labs to rule out metabolic cause, thyroid disease, CBC for infection and anemia, and troponin (suspicion for MI is very low). Also placed future order for stress echo given her symptoms related to exertion. Consider PFTs in the future if symptoms were to persist.   - Advise to be seen in the ER in the meantime if symptoms were to get progressive, worse or new symptoms were to develop.     Return in about 1 week (around 2/26/2020) for Recheck, pending labs and stress test. .     Options for treatment and follow-up care were reviewed with the patient and/or guardian. Patient and/or guardian engaged in the decision making process and verbalized understanding of the options discussed and agreed with the final plan.      Felice Arellano PA-C  [facility]

## 2020-02-20 LAB
DEPRECATED S PYO AG THROAT QL EIA: NEGATIVE
SPECIMEN SOURCE: NORMAL
SPECIMEN SOURCE: NORMAL
STREP GROUP A PCR: NOT DETECTED

## 2020-02-20 ASSESSMENT — ANXIETY QUESTIONNAIRES: GAD7 TOTAL SCORE: 8

## 2020-02-20 ASSESSMENT — PATIENT HEALTH QUESTIONNAIRE - PHQ9: SUM OF ALL RESPONSES TO PHQ QUESTIONS 1-9: 11

## 2020-02-21 ENCOUNTER — TELEPHONE (OUTPATIENT)
Dept: FAMILY MEDICINE | Facility: OTHER | Age: 40
End: 2020-02-21

## 2020-02-21 DIAGNOSIS — R07.9 LEFT-SIDED CHEST PAIN: ICD-10-CM

## 2020-02-21 LAB
ALBUMIN SERPL-MCNC: 3.7 G/DL (ref 3.4–5)
ALP SERPL-CCNC: 100 U/L (ref 40–150)
ALT SERPL W P-5'-P-CCNC: 22 U/L (ref 0–50)
ANION GAP SERPL CALCULATED.3IONS-SCNC: 6 MMOL/L (ref 3–14)
AST SERPL W P-5'-P-CCNC: 18 U/L (ref 0–45)
BASOPHILS # BLD AUTO: 0 10E9/L (ref 0–0.2)
BASOPHILS NFR BLD AUTO: 0.5 %
BILIRUB SERPL-MCNC: 0.5 MG/DL (ref 0.2–1.3)
BUN SERPL-MCNC: 8 MG/DL (ref 7–30)
CALCIUM SERPL-MCNC: 8.9 MG/DL (ref 8.5–10.1)
CHLORIDE SERPL-SCNC: 105 MMOL/L (ref 94–109)
CO2 SERPL-SCNC: 26 MMOL/L (ref 20–32)
CREAT SERPL-MCNC: 0.58 MG/DL (ref 0.52–1.04)
DIFFERENTIAL METHOD BLD: NORMAL
EOSINOPHIL # BLD AUTO: 0.5 10E9/L (ref 0–0.7)
EOSINOPHIL NFR BLD AUTO: 7.2 %
ERYTHROCYTE [DISTWIDTH] IN BLOOD BY AUTOMATED COUNT: 13.8 % (ref 10–15)
GFR SERPL CREATININE-BSD FRML MDRD: >90 ML/MIN/{1.73_M2}
GLUCOSE SERPL-MCNC: 95 MG/DL (ref 70–99)
HCT VFR BLD AUTO: 36 % (ref 35–47)
HGB BLD-MCNC: 12.1 G/DL (ref 11.7–15.7)
LYMPHOCYTES # BLD AUTO: 1.6 10E9/L (ref 0.8–5.3)
LYMPHOCYTES NFR BLD AUTO: 24.7 %
MCH RBC QN AUTO: 29.6 PG (ref 26.5–33)
MCHC RBC AUTO-ENTMCNC: 33.6 G/DL (ref 31.5–36.5)
MCV RBC AUTO: 88 FL (ref 78–100)
MONOCYTES # BLD AUTO: 0.7 10E9/L (ref 0–1.3)
MONOCYTES NFR BLD AUTO: 10.3 %
NEUTROPHILS # BLD AUTO: 3.8 10E9/L (ref 1.6–8.3)
NEUTROPHILS NFR BLD AUTO: 57.3 %
PLATELET # BLD AUTO: 295 10E9/L (ref 150–450)
POTASSIUM SERPL-SCNC: 3.7 MMOL/L (ref 3.4–5.3)
PROT SERPL-MCNC: 8 G/DL (ref 6.8–8.8)
RBC # BLD AUTO: 4.09 10E12/L (ref 3.8–5.2)
SODIUM SERPL-SCNC: 137 MMOL/L (ref 133–144)
TROPONIN I SERPL-MCNC: <0.015 UG/L (ref 0–0.04)
TSH SERPL DL<=0.005 MIU/L-ACNC: 3.01 MU/L (ref 0.4–4)
WBC # BLD AUTO: 6.5 10E9/L (ref 4–11)

## 2020-02-21 PROCEDURE — 36415 COLL VENOUS BLD VENIPUNCTURE: CPT | Performed by: PHYSICIAN ASSISTANT

## 2020-02-21 PROCEDURE — 84484 ASSAY OF TROPONIN QUANT: CPT | Performed by: PHYSICIAN ASSISTANT

## 2020-02-21 PROCEDURE — 80050 GENERAL HEALTH PANEL: CPT | Performed by: PHYSICIAN ASSISTANT

## 2020-02-21 NOTE — TELEPHONE ENCOUNTER
Attempted to reach the patient with the following results:  Left message on voicemail for the patient to call back.     When patient returns call please inform of results below:   Please call patient.  Her thyroid, kidney and liver function and heart enzyme test are all normal which is great.   Recommend stress test, order was already placed.     VIPUL Wood Peds MA

## 2020-02-21 NOTE — RESULT ENCOUNTER NOTE
Attempted to reach the patient with the following results:  Left message on voicemail for the patient to call back.   Lorie Dick MA

## 2020-02-24 ENCOUNTER — MYC MEDICAL ADVICE (OUTPATIENT)
Dept: FAMILY MEDICINE | Facility: OTHER | Age: 40
End: 2020-02-24

## 2020-02-24 ENCOUNTER — HEALTH MAINTENANCE LETTER (OUTPATIENT)
Age: 40
End: 2020-02-24

## 2020-03-09 ENCOUNTER — MYC REFILL (OUTPATIENT)
Dept: FAMILY MEDICINE | Facility: OTHER | Age: 40
End: 2020-03-09

## 2020-03-09 DIAGNOSIS — F32.1 MODERATE MAJOR DEPRESSION (H): ICD-10-CM

## 2020-03-10 ENCOUNTER — TELEPHONE (OUTPATIENT)
Dept: FAMILY MEDICINE | Facility: OTHER | Age: 40
End: 2020-03-10

## 2020-03-10 RX ORDER — ESCITALOPRAM OXALATE 20 MG/1
10 TABLET ORAL DAILY
Qty: 45 TABLET | Refills: 1 | OUTPATIENT
Start: 2020-03-10

## 2020-03-10 NOTE — TELEPHONE ENCOUNTER
Reviewed chart, ES recommended stress test. Patient is scheduled for stress test in 3 days.  Called patient, she has had acid reflux for months along with a cough, underwent testing and wants to be seen to discuss the plan or reevaluate the plan again.  Feels like acid refulx is worse and causing the cough, wonders if she may need an inhaler or daily acid med    Scheduled:   Next 5 appointments (look out 90 days)    Mar 10, 2020  2:00 PM CDT  Office Visit with Felice Arellano PA-C  [facility] [address] [place] [id number]  [phone]   Mar 25, 2020 11:00 AM CDT  PHYSICAL with Dana Kong PA-C  [facility] [address] [place] [id number]  [phone]        Monique Pond, DARSHANAN, RN, PHN

## 2020-03-10 NOTE — TELEPHONE ENCOUNTER
Patient calling to see what Rodger Keller would recommend for her ongoing symptoms of cough & gerd.  She is wondering if there is over the counter medication she can be using or maybe an inhaler?  She stated she is also unsure if she should be seeing someone for the hernia that was found on recent ultrasound?

## 2020-03-10 NOTE — TELEPHONE ENCOUNTER
Pending Prescriptions:                       Disp   Refills    escitalopram (LEXAPRO) 20 MG tablet       45 tab*1            Sig: Take 0.5 tablets (10 mg) by mouth daily    Sent 2/5/20 with 6 month supply. Refill not appropriate at this time.     Monique Pond, BSN, RN, PHN

## 2020-03-10 NOTE — PROGRESS NOTES
"Subjective     Angela Crawford is a 40 year old female who presents to clinic today for the following health issues:    History of Present Illness        She eats 2-3 servings of fruits and vegetables daily.She consumes 0 sweetened beverage(s) daily.She exercises with enough effort to increase her heart rate 20 to 29 minutes per day.  She exercises with enough effort to increase her heart rate 3 or less days per week.   She is taking medications regularly.     Acute Illness Follow up    Acute illness concerns: follow up  Onset: cough has been going on for about 5 months.     Fever: YES- low grade of 99.4    Chills/Sweats: no     Headache (location?): YES- woke up with a really bad headache this morning.     Sinus Pressure:YES    Conjunctivitis:  Unsure, she has been waking up with \"crusty\" eyes.    Ear Pain: YES: left, feels full, losing hearing, has a weird fuzzy sound    Rhinorrhea: YES, a little bit.     Congestion: YES    Sore Throat: YES- a little bit.      Cough: YES-productive of clear sputum, with shortness of breath, has gotten worse.     Chest pain: has gotten worse    Wheeze: YES- she has an exhale wheeze when laying down, she can hear it in her upper throat.     Decreased Appetite: YES- eats maybe once a day.     Nausea: YES- as soon as she eats anything she is in excruciating pain. She has been gagging.     Acid Reflux: has been having more when sitting up     Vomiting: no    Diarrhea:  no - maybe she's constipated ?    Dysuria/Freq.: no    Fatigue/Achiness: YES- doesn't want to get out of bed. Body aches.     Sick/Strep Exposure: no      Therapies Tried and outcome: ibuprofen, she takes nebulizer every morning when she wakes up.    Patient has questions/would like more information of the hernia that was found on the CT scan. She also would like more information on the degenerative joints that was found as well. She does have her stress test on Friday. She is wondering if she can get an inhaler since she " has to use the nebulizer every morning when she wakes up, wondering if that would help.    She still has the cough.  Since her Chest CT and the small hiatal hernia was found she has noticed more reflux.  So bad at night that she can't even lay flat.  She has to prop  Up at night.  Her reflux is bad during the day, worsened by eating food.   Bowel movements not much changed, still some constipation.   She has stress test this Friday.   She is still using albuterol nebulizer treatment in the morning and that helps, she is wondering   She also notes that at night she has been itching rectally like when people have pinworms.  She has no issues with hemorrhoids.   Fatigued but not sleeping well at night due to reflux.     Needs Lexapro refilled.  Mood has been stable.     Current Outpatient Medications   Medication Sig Dispense Refill     albuterol (PROAIR HFA/PROVENTIL HFA/VENTOLIN HFA) 108 (90 Base) MCG/ACT inhaler Inhale 2 puffs into the lungs every 4 hours as needed for shortness of breath / dyspnea or wheezing 18 g 0     escitalopram (LEXAPRO) 20 MG tablet Take 0.5 tablets (10 mg) by mouth daily 45 tablet 1     IBUPROFEN as needed Reported on 4/4/2017       Multiple Vitamins-Minerals (MULTIVITAMIN OR) Take  by mouth.       omeprazole (PRILOSEC) 40 MG DR capsule Take 1 capsule (40 mg) by mouth daily 90 capsule 1     albuterol (PROVENTIL) (2.5 MG/3ML) 0.083% neb solution Take 1 vial (2.5 mg) by nebulization every 6 hours as needed for shortness of breath / dyspnea or wheezing 1 Box 0     Allergies   Allergen Reactions     Seasonal Allergies        Reviewed and updated as needed this visit by Provider  Tobacco  Allergies  Meds  Problems  Med Hx  Surg Hx  Fam Hx         Review of Systems   ROS COMP: Constitutional, HEENT, cardiovascular, pulmonary, GI, , musculoskeletal, neuro, skin, endocrine and psych systems are negative, except as otherwise noted.      Objective    BP (!) 130/94   Pulse 84   Temp 99  F  "(37.2  C) (Temporal)   Resp 16   Ht 1.65 m (5' 4.96\")   Wt 112.7 kg (248 lb 8 oz)   LMP 02/10/2020 (Exact Date)   SpO2 96%   BMI 41.40 kg/m    Body mass index is 41.4 kg/m .  Physical Exam   GENERAL: healthy, alert and no distress  RESP: lungs clear to auscultation - no rales, rhonchi or wheezes  CV: regular rate and rhythm, normal S1 S2, no S3 or S4, no murmur, click or rub, no peripheral edema and peripheral pulses strong  ABDOMEN: soft, non-distended, mildly tender to palpation in epigastric region, no hepatosplenomegaly, no masses and bowel sounds normal  MS: no gross musculoskeletal defects noted, no edema  PSYCH: mentation appears normal, affect normal/bright    Diagnostic Test Results:  Labs reviewed in Epic        Assessment & Plan       ICD-10-CM    1. Epigastric pain  R10.13 Helicobacter pylori Antigen Stool   2. Gastroesophageal reflux disease, esophagitis presence not specified  K21.9 omeprazole (PRILOSEC) 40 MG DR capsule     Helicobacter pylori Antigen Stool   3. Cough  R05 General PFT Lab (Please always keep checked)     Pulmonary Function Test   4. Wheezing  R06.2 General PFT Lab (Please always keep checked)     Pulmonary Function Test     albuterol (PROAIR HFA/PROVENTIL HFA/VENTOLIN HFA) 108 (90 Base) MCG/ACT inhaler   5. Rectal itching  L29.0 Pinworm exam   6. Moderate major depression (H)  F32.1 escitalopram (LEXAPRO) 20 MG tablet   7. Abnormal imaging of thyroid  R93.89 US Thyroid          GERD/Epigastric pain:  - Did obtain H. Pylori, waiting on results.   - started on Omperazole.   - Discussed imaging, it was a small hiatal hernia, if unresponsive to PPI or unable to taper off PPI then consider upper endoscopy.   - Continue with diet modifications - small portions, bland, no spicy, acidic, citric, carbonation    Cough/Wheeze:  - Improved with albuterol.   - Chest CT showed no concerns with lung tissue.   - Recommended PFTs for evaluation of asthma/obstructive process.   - Given inhaler " and instructed proper use in the meantime.   - Would consider steroid inhaler in future.     Rectal itching:  - Pinworm exam completed.   - Question Pruritus Ani which we discussed diet modifications can help.     Mood:  - Stable on Lexapro.     Thyroid:  - Calcifications noted on CT recommended US for evaluation.   - TSH was normal.    - No concerns on exam.     Return in about 2 weeks (around 3/25/2020) for Recheck, pending PFTs and stress test. .    Options for treatment and follow-up care were reviewed with the patient and/or guardian. Patient and/or guardian engaged in the decision making process and verbalized understanding of the options discussed and agreed with the final plan.    Felice Arellano PA-C  [facility]    Answers for HPI/ROS submitted by the patient on 3/11/2020   Chronic problems general questions HPI Form  If you checked off any problems, how difficult have these problems made it for you to do your work, take care of things at home, or get along with other people?: Somewhat difficult  PHQ9 TOTAL SCORE: 18  CLAUDIA 7 TOTAL SCORE: 14

## 2020-03-11 ENCOUNTER — OFFICE VISIT (OUTPATIENT)
Dept: FAMILY MEDICINE | Facility: OTHER | Age: 40
End: 2020-03-11
Payer: COMMERCIAL

## 2020-03-11 VITALS
BODY MASS INDEX: 41.4 KG/M2 | SYSTOLIC BLOOD PRESSURE: 130 MMHG | HEIGHT: 65 IN | DIASTOLIC BLOOD PRESSURE: 94 MMHG | HEART RATE: 84 BPM | OXYGEN SATURATION: 96 % | TEMPERATURE: 99 F | WEIGHT: 248.5 LBS | RESPIRATION RATE: 16 BRPM

## 2020-03-11 DIAGNOSIS — R93.89 ABNORMAL IMAGING OF THYROID: ICD-10-CM

## 2020-03-11 DIAGNOSIS — L29.0 RECTAL ITCHING: ICD-10-CM

## 2020-03-11 DIAGNOSIS — K21.9 GASTROESOPHAGEAL REFLUX DISEASE, ESOPHAGITIS PRESENCE NOT SPECIFIED: ICD-10-CM

## 2020-03-11 DIAGNOSIS — F32.1 MODERATE MAJOR DEPRESSION (H): ICD-10-CM

## 2020-03-11 DIAGNOSIS — R06.2 WHEEZING: ICD-10-CM

## 2020-03-11 DIAGNOSIS — R05.9 COUGH: ICD-10-CM

## 2020-03-11 DIAGNOSIS — R10.13 EPIGASTRIC PAIN: Primary | ICD-10-CM

## 2020-03-11 PROCEDURE — 99214 OFFICE O/P EST MOD 30 MIN: CPT | Performed by: PHYSICIAN ASSISTANT

## 2020-03-11 RX ORDER — ESCITALOPRAM OXALATE 20 MG/1
10 TABLET ORAL DAILY
Qty: 45 TABLET | Refills: 1 | Status: SHIPPED | OUTPATIENT
Start: 2020-03-11 | End: 2020-09-16

## 2020-03-11 RX ORDER — OMEPRAZOLE 40 MG/1
40 CAPSULE, DELAYED RELEASE ORAL DAILY
Qty: 90 CAPSULE | Refills: 1 | Status: SHIPPED | OUTPATIENT
Start: 2020-03-11 | End: 2020-09-15

## 2020-03-11 RX ORDER — ALBUTEROL SULFATE 90 UG/1
2 AEROSOL, METERED RESPIRATORY (INHALATION) EVERY 4 HOURS PRN
Qty: 18 G | Refills: 0 | Status: SHIPPED | OUTPATIENT
Start: 2020-03-11 | End: 2020-03-25

## 2020-03-11 ASSESSMENT — PATIENT HEALTH QUESTIONNAIRE - PHQ9
SUM OF ALL RESPONSES TO PHQ QUESTIONS 1-9: 18
10. IF YOU CHECKED OFF ANY PROBLEMS, HOW DIFFICULT HAVE THESE PROBLEMS MADE IT FOR YOU TO DO YOUR WORK, TAKE CARE OF THINGS AT HOME, OR GET ALONG WITH OTHER PEOPLE: SOMEWHAT DIFFICULT
SUM OF ALL RESPONSES TO PHQ QUESTIONS 1-9: 18

## 2020-03-11 ASSESSMENT — ANXIETY QUESTIONNAIRES
7. FEELING AFRAID AS IF SOMETHING AWFUL MIGHT HAPPEN: MORE THAN HALF THE DAYS
5. BEING SO RESTLESS THAT IT IS HARD TO SIT STILL: MORE THAN HALF THE DAYS
7. FEELING AFRAID AS IF SOMETHING AWFUL MIGHT HAPPEN: MORE THAN HALF THE DAYS
6. BECOMING EASILY ANNOYED OR IRRITABLE: MORE THAN HALF THE DAYS
2. NOT BEING ABLE TO STOP OR CONTROL WORRYING: MORE THAN HALF THE DAYS
4. TROUBLE RELAXING: MORE THAN HALF THE DAYS
3. WORRYING TOO MUCH ABOUT DIFFERENT THINGS: MORE THAN HALF THE DAYS
GAD7 TOTAL SCORE: 14
1. FEELING NERVOUS, ANXIOUS, OR ON EDGE: MORE THAN HALF THE DAYS

## 2020-03-11 ASSESSMENT — MIFFLIN-ST. JEOR: SCORE: 1787.45

## 2020-03-11 NOTE — PATIENT INSTRUCTIONS
- Get the stress test done.   - They will call  You to schedule pulmonary function test.   - Return the stool test to rule out infection causing reflux.   - Start Omeprazole 1 capsule daily in the morning.   - on top of this if needed ok to take Pepcid AC or Zantac 1-2 times per day.     Let me know if things are getting worse or new symptoms.   Otherwise we'll follow-up based on testing.

## 2020-03-12 ASSESSMENT — ANXIETY QUESTIONNAIRES: GAD7 TOTAL SCORE: 14

## 2020-03-12 ASSESSMENT — PATIENT HEALTH QUESTIONNAIRE - PHQ9: SUM OF ALL RESPONSES TO PHQ QUESTIONS 1-9: 18

## 2020-03-13 ENCOUNTER — ANCILLARY PROCEDURE (OUTPATIENT)
Dept: ULTRASOUND IMAGING | Facility: CLINIC | Age: 40
End: 2020-03-13
Attending: PHYSICIAN ASSISTANT
Payer: COMMERCIAL

## 2020-03-13 ENCOUNTER — ANCILLARY PROCEDURE (OUTPATIENT)
Dept: CARDIOLOGY | Facility: CLINIC | Age: 40
End: 2020-03-13
Attending: PHYSICIAN ASSISTANT
Payer: COMMERCIAL

## 2020-03-13 DIAGNOSIS — R05.9 COUGH: ICD-10-CM

## 2020-03-13 DIAGNOSIS — R07.9 LEFT-SIDED CHEST PAIN: ICD-10-CM

## 2020-03-13 DIAGNOSIS — R93.89 ABNORMAL IMAGING OF THYROID: ICD-10-CM

## 2020-03-13 PROCEDURE — 93016 CV STRESS TEST SUPVJ ONLY: CPT | Performed by: INTERNAL MEDICINE

## 2020-03-13 PROCEDURE — 93350 STRESS TTE ONLY: CPT | Performed by: INTERNAL MEDICINE

## 2020-03-13 PROCEDURE — 93017 CV STRESS TEST TRACING ONLY: CPT | Performed by: INTERNAL MEDICINE

## 2020-03-13 PROCEDURE — 76536 US EXAM OF HEAD AND NECK: CPT | Performed by: RADIOLOGY

## 2020-03-13 PROCEDURE — 93325 DOPPLER ECHO COLOR FLOW MAPG: CPT | Performed by: INTERNAL MEDICINE

## 2020-03-13 PROCEDURE — 93321 DOPPLER ECHO F-UP/LMTD STD: CPT | Performed by: INTERNAL MEDICINE

## 2020-03-13 PROCEDURE — 93352 ADMIN ECG CONTRAST AGENT: CPT | Performed by: INTERNAL MEDICINE

## 2020-03-13 PROCEDURE — 93018 CV STRESS TEST I&R ONLY: CPT | Performed by: INTERNAL MEDICINE

## 2020-03-13 RX ADMIN — Medication 5 ML: at 11:30

## 2020-03-20 NOTE — PROGRESS NOTES
"Angela Crawford is a 40 year old female who is being evaluated via a billable telephone visit.      The patient has been notified of following:     \"This telephone visit will be conducted via a call between you and your physician/provider. We have found that certain health care needs can be provided without the need for a physical exam.  This service lets us provide the care you need with a short phone conversation.  If a prescription is necessary we can send it directly to your pharmacy.  If lab work is needed we can place an order for that and you can then stop by our lab to have the test done at a later time.    If during the course of the call the physician/provider feels a telephone visit is not appropriate, you will not be charged for this service.\"     Angela Crawford complains of    Chief Complaint   Patient presents with     Recheck Medication       I have reviewed and updated the patient's Past Medical History, Social History, Family History and Medication List.    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 Moderate major depression (H)  F32.1    2. Wheezing  R06.2 albuterol (PROAIR HFA/PROVENTIL HFA/VENTOLIN HFA) 108 (90 Base) MCG/ACT inhaler   3. Morbid obesity (H)  E66.01    4. Encounter for screening mammogram for breast cancer  Z12.31 MA Screen Bilateral w/[facility]     1. Mood   - Doing very well " considering, stays home with her 2 special needs kids, doing lots of precautions   - Stable, scores actually better than 2 weeks ago   - Reviewed Lexapro use and side effects, on 10 mg, refills not needed     2. Wheezing   - Continues to cough and wheeze, both her boys have respiratory issues, she is a former smoker   - Previous provider had recommended pulmonary testing, she will do this when safe   - Albuterol dose help  - Reviewed inhaler and neb use and side effects, refilled     - Will come in for mammogram, pap, and fasting labs in July, already scheduled       The patient indicates understanding of these issues and agrees with the plan.    Phone call duration:  5 minutes and 30 seconds     Rodger Crawley-VIPUL Keller  [facility] [place] [facility]

## 2020-03-25 ENCOUNTER — VIRTUAL VISIT (OUTPATIENT)
Dept: FAMILY MEDICINE | Facility: OTHER | Age: 40
End: 2020-03-25
Payer: COMMERCIAL

## 2020-03-25 DIAGNOSIS — F32.1 MODERATE MAJOR DEPRESSION (H): Primary | ICD-10-CM

## 2020-03-25 DIAGNOSIS — E66.01 MORBID OBESITY (H): ICD-10-CM

## 2020-03-25 DIAGNOSIS — R06.2 WHEEZING: ICD-10-CM

## 2020-03-25 DIAGNOSIS — Z12.31 ENCOUNTER FOR SCREENING MAMMOGRAM FOR BREAST CANCER: ICD-10-CM

## 2020-03-25 PROCEDURE — 99213 OFFICE O/P EST LOW 20 MIN: CPT | Mod: TEL | Performed by: PHYSICIAN ASSISTANT

## 2020-03-25 RX ORDER — ALBUTEROL SULFATE 90 UG/1
2 AEROSOL, METERED RESPIRATORY (INHALATION) EVERY 4 HOURS PRN
Qty: 48 G | Refills: 4 | Status: SHIPPED | OUTPATIENT
Start: 2020-03-25

## 2020-03-25 RX ORDER — ESCITALOPRAM OXALATE 20 MG/1
10 TABLET ORAL DAILY
Qty: 45 TABLET | Refills: 1 | Status: CANCELLED | OUTPATIENT
Start: 2020-03-25

## 2020-03-25 RX ORDER — ALBUTEROL SULFATE 0.83 MG/ML
2.5 SOLUTION RESPIRATORY (INHALATION) EVERY 6 HOURS PRN
Qty: 1 BOX | Refills: 0 | Status: CANCELLED | OUTPATIENT
Start: 2020-03-25

## 2020-03-25 ASSESSMENT — ANXIETY QUESTIONNAIRES
2. NOT BEING ABLE TO STOP OR CONTROL WORRYING: SEVERAL DAYS
1. FEELING NERVOUS, ANXIOUS, OR ON EDGE: SEVERAL DAYS
7. FEELING AFRAID AS IF SOMETHING AWFUL MIGHT HAPPEN: SEVERAL DAYS
6. BECOMING EASILY ANNOYED OR IRRITABLE: SEVERAL DAYS
5. BEING SO RESTLESS THAT IT IS HARD TO SIT STILL: SEVERAL DAYS
GAD7 TOTAL SCORE: 8
3. WORRYING TOO MUCH ABOUT DIFFERENT THINGS: SEVERAL DAYS
IF YOU CHECKED OFF ANY PROBLEMS ON THIS QUESTIONNAIRE, HOW DIFFICULT HAVE THESE PROBLEMS MADE IT FOR YOU TO DO YOUR WORK, TAKE CARE OF THINGS AT HOME, OR GET ALONG WITH OTHER PEOPLE: SOMEWHAT DIFFICULT

## 2020-03-25 ASSESSMENT — PATIENT HEALTH QUESTIONNAIRE - PHQ9
5. POOR APPETITE OR OVEREATING: MORE THAN HALF THE DAYS
SUM OF ALL RESPONSES TO PHQ QUESTIONS 1-9: 10

## 2020-03-26 ASSESSMENT — ANXIETY QUESTIONNAIRES: GAD7 TOTAL SCORE: 8

## 2020-07-01 ENCOUNTER — TELEPHONE (OUTPATIENT)
Dept: FAMILY MEDICINE | Facility: OTHER | Age: 40
End: 2020-07-01

## 2020-07-01 NOTE — TELEPHONE ENCOUNTER
Hasn't been prescribed since 2017. Would need appointment since controlled substance.    Rodger Kong PA-C  [facility]

## 2020-07-01 NOTE — TELEPHONE ENCOUNTER
Reason for Call:  Medication or medication refill: Medication Refill     Do you use a [facility]?  Name of the pharmacy and phone number for the current request:  [facility] [id number] [place] [facility]     Name of the medication requested: ALPRAZolam (XANAX) 0.25 MG tablet     Other request: Patient would like to know if she can get a refill on Xanax for when she is having anxiety/Panic attacks. Please call patient and advise   Can we leave a detailed message on this number? YES    Phone number patient can be reached at: Cell number on file:    Telephone Information:   Mobile [phone]       Best Time: Anytime     Call taken on 7/1/2020 at 3:27 PM by Alma Zaldivar

## 2020-09-14 DIAGNOSIS — F32.1 MODERATE MAJOR DEPRESSION (H): ICD-10-CM

## 2020-09-14 DIAGNOSIS — K21.9 GASTROESOPHAGEAL REFLUX DISEASE, ESOPHAGITIS PRESENCE NOT SPECIFIED: ICD-10-CM

## 2020-09-15 RX ORDER — OMEPRAZOLE 40 MG/1
CAPSULE, DELAYED RELEASE ORAL
Qty: 90 CAPSULE | Refills: 3 | Status: SHIPPED | OUTPATIENT
Start: 2020-09-15 | End: 2024-06-07

## 2020-09-15 NOTE — TELEPHONE ENCOUNTER
Pending Prescriptions:                       Disp   Refills    escitalopram (LEXAPRO) 20 MG tablet [Pharm*45 tab*1        Sig: TAKE 1/2 TABLET (10 MG) BY MOUTH DAILY    Signed Prescriptions:                        Disp   Refills    omeprazole (PRILOSEC) 40 MG DR capsule     90 cap*3        Sig: TAKE 1 CAPSULE BY MOUTH EVERY DAY  Authorizing Provider: MICHELLE MARIN  Ordering User: LANI KIM      Routing refill request to provider for review/approval because:  Labs out of range:  PHQ9    Malou Kim RN BSN

## 2020-09-15 NOTE — TELEPHONE ENCOUNTER
Omeprazole     Prescription approved per [facility].    Malou Gong RN BSN

## 2020-09-16 RX ORDER — ESCITALOPRAM OXALATE 20 MG/1
TABLET ORAL
Qty: 45 TABLET | Refills: 1 | Status: SHIPPED | OUTPATIENT
Start: 2020-09-16 | End: 2021-03-24

## 2020-09-18 ENCOUNTER — ALLIED HEALTH/NURSE VISIT (OUTPATIENT)
Dept: FAMILY MEDICINE | Facility: OTHER | Age: 40
End: 2020-09-18
Payer: COMMERCIAL

## 2020-09-18 DIAGNOSIS — Z23 NEED FOR PROPHYLACTIC VACCINATION AND INOCULATION AGAINST INFLUENZA: Primary | ICD-10-CM

## 2020-09-18 PROCEDURE — 99207 ZZC NO CHARGE NURSE ONLY: CPT

## 2020-09-18 PROCEDURE — 90471 IMMUNIZATION ADMIN: CPT

## 2020-09-18 PROCEDURE — 90686 IIV4 VACC NO PRSV 0.5 ML IM: CPT

## 2020-12-13 ENCOUNTER — HEALTH MAINTENANCE LETTER (OUTPATIENT)
Age: 40
End: 2020-12-13
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
## 2021-03-23 NOTE — PATIENT INSTRUCTIONS
Patient Education   Personalized Prevention Plan  You are due for the preventive services outlined below.  Your care team is available to assist you in scheduling these services.  If you have already completed any of these items, please share that information with your care team to update in your medical record.  Health Maintenance Due   Topic Date Due     Discuss Advance Care Planning  Never done     Mammogram  Never done     Hepatitis C Screening  Never done     Preventive Care Visit  05/02/2014     PAP Smear  05/02/2016

## 2021-03-24 ENCOUNTER — OFFICE VISIT (OUTPATIENT)
Dept: FAMILY MEDICINE | Facility: OTHER | Age: 41
End: 2021-03-24
Payer: COMMERCIAL

## 2021-03-24 VITALS
SYSTOLIC BLOOD PRESSURE: 138 MMHG | RESPIRATION RATE: 18 BRPM | BODY MASS INDEX: 43.32 KG/M2 | HEART RATE: 92 BPM | TEMPERATURE: 98.4 F | DIASTOLIC BLOOD PRESSURE: 88 MMHG | WEIGHT: 260 LBS | HEIGHT: 65 IN | OXYGEN SATURATION: 99 %

## 2021-03-24 DIAGNOSIS — K21.9 GASTROESOPHAGEAL REFLUX DISEASE WITHOUT ESOPHAGITIS: ICD-10-CM

## 2021-03-24 DIAGNOSIS — Z12.4 SCREENING FOR MALIGNANT NEOPLASM OF CERVIX: ICD-10-CM

## 2021-03-24 DIAGNOSIS — F32.1 MODERATE MAJOR DEPRESSION (H): ICD-10-CM

## 2021-03-24 DIAGNOSIS — F41.9 ANXIETY: ICD-10-CM

## 2021-03-24 DIAGNOSIS — Z00.00 ROUTINE GENERAL MEDICAL EXAMINATION AT A HEALTH CARE FACILITY: Primary | ICD-10-CM

## 2021-03-24 DIAGNOSIS — Z00.00 ENCOUNTER FOR MEDICARE ANNUAL WELLNESS EXAM: ICD-10-CM

## 2021-03-24 DIAGNOSIS — Z11.59 NEED FOR HEPATITIS C SCREENING TEST: ICD-10-CM

## 2021-03-24 DIAGNOSIS — E66.01 MORBID OBESITY (H): ICD-10-CM

## 2021-03-24 DIAGNOSIS — M79.7 FIBROMYALGIA SYNDROME: ICD-10-CM

## 2021-03-24 DIAGNOSIS — F43.10 PTSD (POST-TRAUMATIC STRESS DISORDER): ICD-10-CM

## 2021-03-24 PROBLEM — R06.2 WHEEZING: Status: RESOLVED | Noted: 2020-03-11 | Resolved: 2021-03-24

## 2021-03-24 LAB
ANION GAP SERPL CALCULATED.3IONS-SCNC: 7 MMOL/L (ref 3–14)
BUN SERPL-MCNC: 10 MG/DL (ref 7–30)
CALCIUM SERPL-MCNC: 8.8 MG/DL (ref 8.5–10.1)
CHLORIDE SERPL-SCNC: 108 MMOL/L (ref 94–109)
CHOLEST SERPL-MCNC: 231 MG/DL
CO2 SERPL-SCNC: 24 MMOL/L (ref 20–32)
CREAT SERPL-MCNC: 0.62 MG/DL (ref 0.52–1.04)
GFR SERPL CREATININE-BSD FRML MDRD: >90 ML/MIN/{1.73_M2}
GLUCOSE SERPL-MCNC: 92 MG/DL (ref 70–99)
HBA1C MFR BLD: 5.1 % (ref 0–5.6)
HDLC SERPL-MCNC: 48 MG/DL
LDLC SERPL CALC-MCNC: 141 MG/DL
NONHDLC SERPL-MCNC: 183 MG/DL
POTASSIUM SERPL-SCNC: 4 MMOL/L (ref 3.4–5.3)
SODIUM SERPL-SCNC: 139 MMOL/L (ref 133–144)
TRIGL SERPL-MCNC: 208 MG/DL
TSH SERPL DL<=0.005 MIU/L-ACNC: 2.35 MU/L (ref 0.4–4)

## 2021-03-24 PROCEDURE — 80048 BASIC METABOLIC PNL TOTAL CA: CPT | Performed by: FAMILY MEDICINE

## 2021-03-24 PROCEDURE — 99396 PREV VISIT EST AGE 40-64: CPT | Performed by: FAMILY MEDICINE

## 2021-03-24 PROCEDURE — 36415 COLL VENOUS BLD VENIPUNCTURE: CPT | Performed by: FAMILY MEDICINE

## 2021-03-24 PROCEDURE — 84443 ASSAY THYROID STIM HORMONE: CPT | Performed by: FAMILY MEDICINE

## 2021-03-24 PROCEDURE — 83036 HEMOGLOBIN GLYCOSYLATED A1C: CPT | Performed by: FAMILY MEDICINE

## 2021-03-24 PROCEDURE — 80061 LIPID PANEL: CPT | Performed by: FAMILY MEDICINE

## 2021-03-24 PROCEDURE — G0123 SCREEN CERV/VAG THIN LAYER: HCPCS | Performed by: FAMILY MEDICINE

## 2021-03-24 PROCEDURE — 87624 HPV HI-RISK TYP POOLED RSLT: CPT | Performed by: FAMILY MEDICINE

## 2021-03-24 RX ORDER — ALPRAZOLAM 0.5 MG
0.5 TABLET ORAL 3 TIMES DAILY PRN
Qty: 6 TABLET | Refills: 0 | Status: SHIPPED | OUTPATIENT
Start: 2021-03-24 | End: 2024-06-07

## 2021-03-24 RX ORDER — ESCITALOPRAM OXALATE 20 MG/1
TABLET ORAL
Qty: 90 TABLET | Refills: 0 | Status: SHIPPED | OUTPATIENT
Start: 2021-03-24 | End: 2021-11-15

## 2021-03-24 ASSESSMENT — ENCOUNTER SYMPTOMS
COUGH: 0
HEMATURIA: 0
ABDOMINAL PAIN: 1
DYSURIA: 0
HEMATOCHEZIA: 0
DIZZINESS: 0
FREQUENCY: 0
DIARRHEA: 0
SORE THROAT: 0
MYALGIAS: 1
BREAST MASS: 0
CONSTIPATION: 0
NAUSEA: 1
PALPITATIONS: 0
CHILLS: 0
WEAKNESS: 1
PARESTHESIAS: 1
SHORTNESS OF BREATH: 0
FEVER: 0
HEARTBURN: 1
HEADACHES: 1
ARTHRALGIAS: 1
NERVOUS/ANXIOUS: 1
EYE PAIN: 0
JOINT SWELLING: 0

## 2021-03-24 ASSESSMENT — ANXIETY QUESTIONNAIRES
7. FEELING AFRAID AS IF SOMETHING AWFUL MIGHT HAPPEN: MORE THAN HALF THE DAYS
GAD7 TOTAL SCORE: 9
3. WORRYING TOO MUCH ABOUT DIFFERENT THINGS: MORE THAN HALF THE DAYS
5. BEING SO RESTLESS THAT IT IS HARD TO SIT STILL: SEVERAL DAYS
6. BECOMING EASILY ANNOYED OR IRRITABLE: SEVERAL DAYS
2. NOT BEING ABLE TO STOP OR CONTROL WORRYING: SEVERAL DAYS
GAD7 TOTAL SCORE: 9
GAD7 TOTAL SCORE: 9
1. FEELING NERVOUS, ANXIOUS, OR ON EDGE: SEVERAL DAYS
7. FEELING AFRAID AS IF SOMETHING AWFUL MIGHT HAPPEN: MORE THAN HALF THE DAYS
4. TROUBLE RELAXING: SEVERAL DAYS

## 2021-03-24 ASSESSMENT — PATIENT HEALTH QUESTIONNAIRE - PHQ9
SUM OF ALL RESPONSES TO PHQ QUESTIONS 1-9: 11
SUM OF ALL RESPONSES TO PHQ QUESTIONS 1-9: 11
10. IF YOU CHECKED OFF ANY PROBLEMS, HOW DIFFICULT HAVE THESE PROBLEMS MADE IT FOR YOU TO DO YOUR WORK, TAKE CARE OF THINGS AT HOME, OR GET ALONG WITH OTHER PEOPLE: SOMEWHAT DIFFICULT

## 2021-03-24 ASSESSMENT — PAIN SCALES - GENERAL: PAINLEVEL: MODERATE PAIN (4)

## 2021-03-24 ASSESSMENT — MIFFLIN-ST. JEOR: SCORE: 1828.35

## 2021-03-24 NOTE — LETTER
My Depression Action Plan  Name: Angela Crawford   Date of Birth 1980  Date: 3/24/2021    My doctor: Dana Kong   My clinic: [address]  [facility] [address]  [phone]          GREEN    ZONE   Good Control    What it looks like:     Things are going generally well. You have normal ups and downs. You may even feel depressed from time to time, but bad moods usually last less than a day.   What you need to do:  1. Continue to care for yourself (see self care plan)  2. Check your depression survival kit and update it as needed  3. Follow your physician s recommendations including any medication.  4. Do not stop taking medication unless you consult with your physician first.           YELLOW         ZONE Getting Worse    What it looks like:     Depression is starting to interfere with your life.     It may be hard to get out of bed; you may be starting to isolate yourself from others.    Symptoms of depression are starting to last most all day and this has happened for several days.     You may have suicidal thoughts but they are not constant.   What you need to do:     1. Call your care team. Your response to treatment will improve if you keep your care team informed of your progress. Yellow periods are signs an adjustment may need to be made.     2. Continue your self-care.  Just get dressed and ready for the day.  Don't give yourself time to talk yourself out of it.    3. Talk to someone in your support network.    4. Open up your Depression Self-Care Plan/Wellness Kit.           RED    ZONE Medical Alert - Get Help    What it looks like:     Depression is seriously interfering with your life.     You may experience these or other symptoms: You can t get out of bed most days, can t work or engage in other necessary activities, you have trouble taking care of basic hygiene, or basic responsibilities, thoughts of suicide or  death that will not go away, self-injurious behavior.     What you need to do:  1. Call your care team and request a same-day appointment. If they are not available (weekends or after hours) call your local crisis line, emergency room or 911.          Depression Self-Care Plan / Wellness Kit    Many people find that medication and therapy are helpful treatments for managing depression. In addition, making small changes to your everyday life can help to boost your mood and improve your wellbeing. Below are some tips for you to consider. Be sure to talk with your medical provider and/or behavioral health consultant if your symptoms are worsening or not improving.     Sleep   Sleep hygiene  means all of the habits that support good, restful sleep. It includes maintaining a consistent bedtime and wake time, using your bedroom only for sleeping or sex, and keeping the bedroom dark and free of distractions like a computer, smartphone, or television.     Develop a Healthy Routine  Maintain good hygiene. Get out of bed in the morning, make your bed, brush your teeth, take a shower, and get dressed. Don t spend too much time viewing media that makes you feel stressed. Find time to relax each day.    Exercise  Get some form of exercise every day. This will help reduce pain and release endorphins, the  feel good  chemicals in your brain. It can be as simple as just going for a walk or doing some gardening, anything that will get you moving.      Diet  Strive to eat healthy foods, including fruits and vegetables. Drink plenty of water. Avoid excessive sugar, caffeine, alcohol, and other mood-altering substances.     Stay Connected with Others  Stay in touch with friends and family members.    Manage Your Mood  Try deep breathing, massage therapy, biofeedback, or meditation. Take part in fun activities when you can. Try to find something to smile about each day.     Psychotherapy  Be open to working with a therapist if your  provider recommends it.     Medication  Be sure to take your medication as prescribed. Most anti-depressants need to be taken every day. It usually takes several weeks for medications to work. Not all medicines work for all people. It is important to follow-up with your provider to make sure you have a treatment plan that is working for you. Do not stop your medication abruptly without first discussing it with your provider.    Crisis Resources   These hotlines are for both adults and children. They and are open 24 hours a day, 7 days a week unless noted otherwise.      National Suicide Prevention Lifeline   1-540-016-QZJV (2123)      Crisis Text Line    www.crisistextline.org  Text HOME to 096102 from anywhere in the [place], anytime, about any type of crisis. A live, trained crisis counselor will receive the text and respond quickly.      [facility] Lifeline for LGBTQ Youth  A national crisis intervention and suicide lifeline for LGBTQ youth under 25. Provides a safe place to talk without judgement. Call 1-368.555.9461; text START to 412278 or visit www.thetrevorproject.org to talk to a trained counselor.      For [facility] crisis numbers, visit the [facility] website at:  https://[place].gov/dhs/people-we-serve/adults/health-care/mental-health/resources/crisis-contacts.jsp

## 2021-03-25 ASSESSMENT — PATIENT HEALTH QUESTIONNAIRE - PHQ9: SUM OF ALL RESPONSES TO PHQ QUESTIONS 1-9: 11

## 2021-03-25 ASSESSMENT — ANXIETY QUESTIONNAIRES: GAD7 TOTAL SCORE: 9

## 2021-03-26 LAB
COPATH REPORT: NORMAL
PAP: NORMAL

## 2021-03-29 LAB
FINAL DIAGNOSIS: NORMAL
HPV HR 12 DNA CVX QL NAA+PROBE: NEGATIVE
HPV16 DNA SPEC QL NAA+PROBE: NEGATIVE
HPV18 DNA SPEC QL NAA+PROBE: NEGATIVE
SPECIMEN DESCRIPTION: NORMAL
SPECIMEN SOURCE CVX/VAG CYTO: NORMAL

## 2021-06-23 NOTE — PROGRESS NOTES
Assessment & Plan     Cervical lymphadenopathy  - Patient does have some noticeable neck swelling, hard to tell or feel for any specific lymphadenopathy.   - Recommend ultrasound given the length of her symptoms and concerns.   - US Head Neck Soft Tissue; Future    Neck swelling    - US Head Neck Soft Tissue; Future    Recurrent acute suppurative otitis media without spontaneous rupture of tympanic membrane of both sides  - No active infection at this time.  - Recommend ENT follow up for her chronic ear infections and neck swelling. She also has some snoring and curious if this is due to nasal structures.   - OTOLARYNGOLOGY REFERRAL    Acute otitis externa of left ear, unspecified type  - Left ear continues to have some redness and swelling. No inner ear infection noted today.   - neomycin-polymyxin-hydrocortisone (CORTISPORIN) 3.5-22177-7 otic solution; Place 3 drops in ear(s) 3 times daily for 7 days       The patient indicates understanding of these issues and agrees with the plan.    There are no Patient Instructions on file for this visit.    Return in about 4 weeks (around 7/23/2021).    SHI Cox CNP  [facility]    Ima Miller is a 41 year old who presents for the following health issues     History of Present Illness       She eats 2-3 servings of fruits and vegetables daily.She consumes 1 sweetened beverage(s) daily.She exercises with enough effort to increase her heart rate 10 to 19 minutes per day.  She exercises with enough effort to increase her heart rate 3 or less days per week.   She is taking medications regularly.       ED/UC Followup:    Facility:  [facility]  Date of visit: 5/17/2021  Reason for visit: ear pain, swollen lymph nodes   Current Status: throat pain, sharp pain on the right side, she has been getting a lot of right ear pain along with mostly left ear pain. her lymph nodes on her neck are always swollen. In the  "last year she has noticed that things have been really large and moving. She has been having a lot of reoccurring ear infections.            Review of Systems         Objective    /76   Pulse 80   Temp 98.7  F (37.1  C) (Temporal)   Resp 16   Ht 1.65 m (5' 4.96\")   Wt 116.1 kg (256 lb)   LMP 05/26/2021 (Approximate)   SpO2 99%   BMI 42.65 kg/m    Body mass index is 42.65 kg/m .  Physical Exam   GENERAL: healthy, alert and no distress  HENT: normal cephalic/atraumatic, right ear: normal: no effusions, no erythema, normal landmarks, left ear: normal TM mobility on insufflation and erythematous, nose and mouth without ulcers or lesions, oropharynx clear and oral mucous membranes moist  NECK: no asymmetry, masses, or scars and thyroid normal to palpation  SKIN: no suspicious lesions or rashes  NEURO: Normal strength and tone, mentation intact and speech normal  PSYCH: mentation appears normal, affect normal/bright    Diagnostic: Pending             "

## 2021-06-25 ENCOUNTER — OFFICE VISIT (OUTPATIENT)
Dept: FAMILY MEDICINE | Facility: OTHER | Age: 41
End: 2021-06-25
Payer: COMMERCIAL

## 2021-06-25 VITALS
OXYGEN SATURATION: 99 % | WEIGHT: 256 LBS | RESPIRATION RATE: 16 BRPM | HEIGHT: 65 IN | SYSTOLIC BLOOD PRESSURE: 124 MMHG | TEMPERATURE: 98.7 F | HEART RATE: 80 BPM | BODY MASS INDEX: 42.65 KG/M2 | DIASTOLIC BLOOD PRESSURE: 76 MMHG

## 2021-06-25 DIAGNOSIS — H60.502 ACUTE OTITIS EXTERNA OF LEFT EAR, UNSPECIFIED TYPE: ICD-10-CM

## 2021-06-25 DIAGNOSIS — R22.1 NECK SWELLING: ICD-10-CM

## 2021-06-25 DIAGNOSIS — H66.006 RECURRENT ACUTE SUPPURATIVE OTITIS MEDIA WITHOUT SPONTANEOUS RUPTURE OF TYMPANIC MEMBRANE OF BOTH SIDES: ICD-10-CM

## 2021-06-25 DIAGNOSIS — R59.0 CERVICAL LYMPHADENOPATHY: Primary | ICD-10-CM

## 2021-06-25 PROBLEM — R12 HEARTBURN: Status: ACTIVE | Noted: 2021-06-25

## 2021-06-25 PROBLEM — K58.9 IBS (IRRITABLE BOWEL SYNDROME): Status: ACTIVE | Noted: 2020-02-21

## 2021-06-25 PROBLEM — H60.60 CHRONIC OTITIS EXTERNA: Status: ACTIVE | Noted: 2020-02-21

## 2021-06-25 PROBLEM — K44.9 HH (HIATUS HERNIA): Status: ACTIVE | Noted: 2020-02-21

## 2021-06-25 PROBLEM — G93.32 CHRONIC FATIGUE SYNDROME: Status: ACTIVE | Noted: 2020-02-21

## 2021-06-25 PROCEDURE — 99214 OFFICE O/P EST MOD 30 MIN: CPT | Performed by: NURSE PRACTITIONER

## 2021-06-25 RX ORDER — NEOMYCIN SULFATE, POLYMYXIN B SULFATE, HYDROCORTISONE 3.5; 10000; 1 MG/ML; [USP'U]/ML; MG/ML
3 SOLUTION/ DROPS AURICULAR (OTIC) 3 TIMES DAILY
Qty: 4 ML | Refills: 0 | Status: SHIPPED | OUTPATIENT
Start: 2021-06-25 | End: 2021-07-02

## 2021-06-25 ASSESSMENT — PAIN SCALES - GENERAL: PAINLEVEL: MILD PAIN (3)

## 2021-06-25 ASSESSMENT — MIFFLIN-ST. JEOR: SCORE: 1816.47

## 2021-06-29 ENCOUNTER — HOSPITAL ENCOUNTER (OUTPATIENT)
Dept: ULTRASOUND IMAGING | Facility: CLINIC | Age: 41
Discharge: HOME | End: 2021-06-29
Attending: NURSE PRACTITIONER | Admitting: NURSE PRACTITIONER
Payer: COMMERCIAL

## 2021-06-29 DIAGNOSIS — R22.1 NECK SWELLING: ICD-10-CM

## 2021-06-29 DIAGNOSIS — R59.0 CERVICAL LYMPHADENOPATHY: ICD-10-CM

## 2021-06-29 PROCEDURE — 76536 US EXAM OF HEAD AND NECK: CPT

## 2021-08-17 ENCOUNTER — MYC MEDICAL ADVICE (OUTPATIENT)
Dept: FAMILY MEDICINE | Facility: OTHER | Age: 41
End: 2021-08-17

## 2021-08-17 NOTE — TELEPHONE ENCOUNTER
Patient states she has an area that is draining.    She would like to have this opened next week.    Patient will send a mychart picture.  Daisy Martins RN on 8/17/2021 at 1:44 PM

## 2021-08-18 NOTE — TELEPHONE ENCOUNTER
See mychart pictures.    It is not hot, it is painful when she pushes it.    Patient states it is scabbed over.    Patient will monitor and watch due to being scabbing over.    She will watch redness, hot the touch, pain increases, and drainage.    Daisy Martins RN on 8/18/2021 at 10:13 AM

## 2021-08-23 NOTE — PROGRESS NOTES
Assessment & Plan     Infected cyst of skin  Patient denies having a known cyst in this area.  Does feel it started out as a bug bite which developed in the cellulitis.  Cellulitis has greatly improved but she still has a tender area of induration.  I am not sure that an incision and drainage would be helpful as I cannot feel any fluctuance.  Can also not get any drainage out of the open area.  We will place her on Bactrim and refer her to surgery discussed whether this needs to be removed.    - Adult General Surg Referral; Future  - sulfamethoxazole-trimethoprim (BACTRIM DS) 800-160 MG tablet; Take 1 tablet by mouth 2 times daily for 7 days    Katie Berger MD  [facility] REECE Miller is a 41 year old who presents for the following health issues     History of Present Illness       She eats 4 or more servings of fruits and vegetables daily.She exercises with enough effort to increase her heart rate 10 to 19 minutes per day.  She exercises with enough effort to increase her heart rate 3 or less days per week.   She is taking medications regularly.       Skin Lesion  Onset/Duration: Around 8/11/21 started from a bug bite that got infected   Description  Location: Upper arm back of left   dx with celulitis at [facility]. Was on Keflex, now finished, but now thinks it needs to be drained, mass like around bug bite and having pinching pain.     States that her whole back of her upper arm was bright red.  It is now back to about what its initial size was.  There is a hole in the center that has been draining a clear to pinkish fluid.  The area is tender to palpation.  She denies fevers.        Objective    /60   Pulse 97   Temp 97.4  F (36.3  C) (Temporal)   Resp 20   Wt 118.2 kg (260 lb 8 oz)   LMP 08/23/2021   SpO2 97%   BMI 43.40 kg/m    Body mass index is 43.4 kg/m .  Physical Exam   Gen:  no apparent distress  Skin: Posterior right upper arm there is 1 mm  open area without discharge surrounded by 1.5 inch x 0.75 inch induration.  Mild erythema around the open area.  No warmth.  Induration is tender to touch.  Axillae: No axillary lymphadenopathy

## 2021-08-24 ENCOUNTER — OFFICE VISIT (OUTPATIENT)
Dept: FAMILY MEDICINE | Facility: OTHER | Age: 41
End: 2021-08-24
Payer: COMMERCIAL

## 2021-08-24 VITALS
HEART RATE: 97 BPM | RESPIRATION RATE: 20 BRPM | SYSTOLIC BLOOD PRESSURE: 120 MMHG | OXYGEN SATURATION: 97 % | WEIGHT: 260.5 LBS | DIASTOLIC BLOOD PRESSURE: 60 MMHG | BODY MASS INDEX: 43.4 KG/M2 | TEMPERATURE: 97.4 F

## 2021-08-24 DIAGNOSIS — L08.9 INFECTED CYST OF SKIN: Primary | ICD-10-CM

## 2021-08-24 DIAGNOSIS — L72.9 INFECTED CYST OF SKIN: Primary | ICD-10-CM

## 2021-08-24 PROCEDURE — 99213 OFFICE O/P EST LOW 20 MIN: CPT | Performed by: FAMILY MEDICINE

## 2021-08-24 RX ORDER — SULFAMETHOXAZOLE/TRIMETHOPRIM 800-160 MG
1 TABLET ORAL 2 TIMES DAILY
Qty: 14 TABLET | Refills: 0 | Status: SHIPPED | OUTPATIENT
Start: 2021-08-24 | End: 2021-08-31

## 2021-08-24 ASSESSMENT — PAIN SCALES - GENERAL: PAINLEVEL: SEVERE PAIN (6)

## 2021-08-25 ASSESSMENT — ASTHMA QUESTIONNAIRES: ACT_TOTALSCORE: 22

## 2021-09-26 ENCOUNTER — HEALTH MAINTENANCE LETTER (OUTPATIENT)
Age: 41
End: 2021-09-26

## 2021-11-12 DIAGNOSIS — F32.1 MODERATE MAJOR DEPRESSION (H): ICD-10-CM

## 2021-11-15 RX ORDER — ESCITALOPRAM OXALATE 20 MG/1
TABLET ORAL
Qty: 90 TABLET | Refills: 0 | Status: SHIPPED | OUTPATIENT
Start: 2021-11-15 | End: 2022-05-25

## 2021-11-15 NOTE — TELEPHONE ENCOUNTER
Pending Prescriptions:                       Disp   Refills    escitalopram (LEXAPRO) 20 MG tablet        90 tab*0        Sig: TAKE 1 TABLET  BY MOUTH DAILY    Routing refill request to provider for review/approval because:  Labs out of range:  PHQ-9 score:    PHQ 9/10/2021   PHQ-9 Total Score 11   Q9: Thoughts of better off dead/self-harm past 2 weeks Not at all

## 2022-01-16 ENCOUNTER — HEALTH MAINTENANCE LETTER (OUTPATIENT)
Age: 42
End: 2022-01-16

## 2022-02-17 PROBLEM — K21.9 GASTROESOPHAGEAL REFLUX DISEASE: Status: ACTIVE | Noted: 2020-03-11

## 2022-04-22 NOTE — PROGRESS NOTES
Assessment & Plan       ICD-10-CM    1. Moderate episode of recurrent major depressive disorder (H)  F33.1    2. Visit for screening mammogram  Z12.31 MA SCREENING DIGITAL BILAT - Future  (s+30)   3. Need for hepatitis C screening test  Z11.59 Hepatitis C Screen Reflex to HCV RNA Quant and Genotype     Hepatitis C Screen Reflex to HCV RNA Quant and Genotype   4. Morbid obesity (H)  E66.01 Lipid panel reflex to direct LDL Fasting     Comprehensive metabolic panel (BMP + Alb, Alk Phos, ALT, AST, Total. Bili, TP)     Hemoglobin A1c     Lipid panel reflex to direct LDL Fasting     Comprehensive metabolic panel (BMP + Alb, Alk Phos, ALT, AST, Total. Bili, TP)     Hemoglobin A1c   5. Diastasis recti  M62.08    6. Umbilical hernia without obstruction and without gangrene  K42.9    7. Esophageal dysphagia  R13.19 Adult Gastro Ref - Procedure Only   8. Palpitations  R00.2 EKG 12-lead complete w/read - Clinics     TSH with free T4 reflex     CBC with platelets     TSH with free T4 reflex     CBC with platelets   9. Trapezius muscle spasm  M62.838 Physical Therapy Referral   10. RUQ abdominal pain  R10.11 Comprehensive metabolic panel (BMP + Alb, Alk Phos, ALT, AST, Total. Bili, TP)     US Abdomen Complete     Comprehensive metabolic panel (BMP + Alb, Alk Phos, ALT, AST, Total. Bili, TP)     Patient has a long history of symptoms for which we will start with the primary work-up and maybe have her come back after this for following up if that because of these issues or not clear.  She is aware of this today.  Her cluster symptoms seem most consistent with anxiety though she states she is doing well at this time and is not relevant.  EKG did not show palpitations so if she continues to experience these we can consider a Holter monitor.  She had right upper quadrant abdominal pain during the exam today so a  right upper quadrant ultrasound was planned.  She describes a history of umbilical hernia though her exam is consistent  "with diastases recti so we did advise her to consider physical therapy to strengthen the area supporting though she is interested in considering surgical management which would require her to be as good surgical candidate and given her other symptoms we need to work these up first.  Patient also had upper shoulders and neck pain that is consistent with trapezius muscle tightness.  Also she is having musculoskeletal chest discomfort as it is reproducible to palpation.  This is reassurance for not needing another stress test.    Review of the result(s) of each unique test - cmp, hg, lipid, a1c  36 minutes spent on the date of the encounter doing chart review, history and exam, documentation and further activities per the note       BMI:   Estimated body mass index is 44.48 kg/m  as calculated from the following:    Height as of 6/25/21: 1.65 m (5' 4.96\").    Weight as of this encounter: 121.1 kg (267 lb).   Weight management plan: Discussed healthy diet and exercise guidelines        No follow-ups on file.    Angela Redman MD, MD  [facility]   Angela is a 42 year old who presents for the following health issues     History of Present Illness       Reason for visit:  Her is pain. Discuss  surgery.  Symptom onset:  More than a month  Symptoms include:  A nominal & chest Pain, reflux, vomiting, difficulty swallowing,  Symptom intensity:  Severe  Symptom progression:  Worsening  Had these symptoms before:  Yes  Has tried/received treatment for these symptoms:  No  What makes it worse:  Eating, swallowing drinks, activities of daily living, exercise, bending over.  What makes it better:  Not eating, not moving.    She eats 2-3 servings of fruits and vegetables daily.She consumes 0 sweetened beverage(s) daily.She exercises with enough effort to increase her heart rate 9 or less minutes per day.  She exercises with enough effort to increase her heart rate 3 or less days per week.   She is " taking medications regularly.         Patient is known to have had a hernia previously and she was concerned about this causing her abdominal pain now.  But she also describes having a burning sensation in her chest tightness in her chest as well as difficulty with swallowing.  She occasionally gets palpitations as well.  The pain that she is experiencing generally is in the left upper quadrant per her history.     Review of Systems   Constitutional, HEENT, cardiovascular, pulmonary, GI, , musculoskeletal, neuro, skin, endocrine and psych systems are negative, except as otherwise noted.      Objective    /70   Pulse 87   Temp 97.5  F (36.4  C) (Temporal)   Resp 16   Wt 121.1 kg (267 lb)   LMP 04/20/2022   SpO2 99%   BMI 44.48 kg/m    Body mass index is 44.48 kg/m .  Physical Exam   GENERAL: healthy, alert and no distress  EYES: Eyes grossly normal to inspection, PERRL and conjunctivae and sclerae normal  HENT: ear canals and TM's normal, nose and mouth without ulcers or lesions  NECK: no adenopathy, no asymmetry, masses, or scars and thyroid normal to palpation  RESP: lungs clear to auscultation - no rales, rhonchi or wheezes  CV: regular rate and rhythm, normal S1 S2, no S3 or S4, no murmur, click or rub, no peripheral edema and peripheral pulses strong  ABDOMEN: Right upper quadrant tenderness as well as tenderness across the diastases recti which is about 5 inches wide and she is morbidly obese  MS: Reproducible chest wall tenderness as well as trapezius area tenderness bilaterally.  The full range of motions to her shoulders are equal bilaterally  SKIN: no suspicious lesions or rashes  NEURO: Normal strength and tone, mentation intact and speech normal  PSYCH: mentation appears normal, affect normal/bright    EKG - Reviewed and interpreted by me appears normal, NSR, normal axis, normal intervals, no acute ST/T changes c/w ischemia, no LVH by voltage criteria, unchanged from previous tracings

## 2022-04-25 ENCOUNTER — OFFICE VISIT (OUTPATIENT)
Dept: FAMILY MEDICINE | Facility: OTHER | Age: 42
End: 2022-04-25
Payer: COMMERCIAL

## 2022-04-25 VITALS
BODY MASS INDEX: 44.48 KG/M2 | HEART RATE: 87 BPM | WEIGHT: 267 LBS | TEMPERATURE: 97.5 F | DIASTOLIC BLOOD PRESSURE: 70 MMHG | RESPIRATION RATE: 16 BRPM | SYSTOLIC BLOOD PRESSURE: 118 MMHG | OXYGEN SATURATION: 99 %

## 2022-04-25 DIAGNOSIS — M62.08 DIASTASIS RECTI: ICD-10-CM

## 2022-04-25 DIAGNOSIS — M62.838 TRAPEZIUS MUSCLE SPASM: ICD-10-CM

## 2022-04-25 DIAGNOSIS — R00.2 PALPITATIONS: ICD-10-CM

## 2022-04-25 DIAGNOSIS — Z11.59 NEED FOR HEPATITIS C SCREENING TEST: ICD-10-CM

## 2022-04-25 DIAGNOSIS — K42.9 UMBILICAL HERNIA WITHOUT OBSTRUCTION AND WITHOUT GANGRENE: ICD-10-CM

## 2022-04-25 DIAGNOSIS — R13.19 ESOPHAGEAL DYSPHAGIA: ICD-10-CM

## 2022-04-25 DIAGNOSIS — F33.1 MODERATE EPISODE OF RECURRENT MAJOR DEPRESSIVE DISORDER (H): Primary | ICD-10-CM

## 2022-04-25 DIAGNOSIS — Z12.31 VISIT FOR SCREENING MAMMOGRAM: ICD-10-CM

## 2022-04-25 DIAGNOSIS — R10.11 RUQ ABDOMINAL PAIN: ICD-10-CM

## 2022-04-25 DIAGNOSIS — E66.01 MORBID OBESITY (H): ICD-10-CM

## 2022-04-25 LAB
ALBUMIN SERPL-MCNC: 3.8 G/DL (ref 3.4–5)
ALP SERPL-CCNC: 93 U/L (ref 40–150)
ALT SERPL W P-5'-P-CCNC: 21 U/L (ref 0–50)
ANION GAP SERPL CALCULATED.3IONS-SCNC: 3 MMOL/L (ref 3–14)
AST SERPL W P-5'-P-CCNC: 15 U/L (ref 0–45)
BILIRUB SERPL-MCNC: 0.3 MG/DL (ref 0.2–1.3)
BUN SERPL-MCNC: 12 MG/DL (ref 7–30)
CALCIUM SERPL-MCNC: 8.3 MG/DL (ref 8.5–10.1)
CHLORIDE BLD-SCNC: 108 MMOL/L (ref 94–109)
CHOLEST SERPL-MCNC: 196 MG/DL
CO2 SERPL-SCNC: 26 MMOL/L (ref 20–32)
CREAT SERPL-MCNC: 0.6 MG/DL (ref 0.52–1.04)
ERYTHROCYTE [DISTWIDTH] IN BLOOD BY AUTOMATED COUNT: 12.9 % (ref 10–15)
FASTING STATUS PATIENT QL REPORTED: YES
GFR SERPL CREATININE-BSD FRML MDRD: >90 ML/MIN/1.73M2
GLUCOSE BLD-MCNC: 94 MG/DL (ref 70–99)
HBA1C MFR BLD: 5.2 % (ref 0–5.6)
HCT VFR BLD AUTO: 34.1 % (ref 35–47)
HCV AB SERPL QL IA: NONREACTIVE
HDLC SERPL-MCNC: 43 MG/DL
HGB BLD-MCNC: 11.6 G/DL (ref 11.7–15.7)
LDLC SERPL CALC-MCNC: 128 MG/DL
MCH RBC QN AUTO: 30.2 PG (ref 26.5–33)
MCHC RBC AUTO-ENTMCNC: 34 G/DL (ref 31.5–36.5)
MCV RBC AUTO: 89 FL (ref 78–100)
NONHDLC SERPL-MCNC: 153 MG/DL
PLATELET # BLD AUTO: 330 10E3/UL (ref 150–450)
POTASSIUM BLD-SCNC: 4.3 MMOL/L (ref 3.4–5.3)
PROT SERPL-MCNC: 7.7 G/DL (ref 6.8–8.8)
RBC # BLD AUTO: 3.84 10E6/UL (ref 3.8–5.2)
SODIUM SERPL-SCNC: 137 MMOL/L (ref 133–144)
T4 FREE SERPL-MCNC: 0.94 NG/DL (ref 0.76–1.46)
TRIGL SERPL-MCNC: 123 MG/DL
TSH SERPL DL<=0.005 MIU/L-ACNC: 4.32 MU/L (ref 0.4–4)
WBC # BLD AUTO: 5.9 10E3/UL (ref 4–11)

## 2022-04-25 PROCEDURE — 99214 OFFICE O/P EST MOD 30 MIN: CPT | Performed by: FAMILY MEDICINE

## 2022-04-25 PROCEDURE — 80061 LIPID PANEL: CPT | Performed by: FAMILY MEDICINE

## 2022-04-25 PROCEDURE — 80053 COMPREHEN METABOLIC PANEL: CPT | Performed by: FAMILY MEDICINE

## 2022-04-25 PROCEDURE — 84439 ASSAY OF FREE THYROXINE: CPT | Performed by: FAMILY MEDICINE

## 2022-04-25 PROCEDURE — 85027 COMPLETE CBC AUTOMATED: CPT | Performed by: FAMILY MEDICINE

## 2022-04-25 PROCEDURE — 86803 HEPATITIS C AB TEST: CPT | Performed by: FAMILY MEDICINE

## 2022-04-25 PROCEDURE — 84443 ASSAY THYROID STIM HORMONE: CPT | Performed by: FAMILY MEDICINE

## 2022-04-25 PROCEDURE — 93000 ELECTROCARDIOGRAM COMPLETE: CPT | Performed by: FAMILY MEDICINE

## 2022-04-25 PROCEDURE — 36415 COLL VENOUS BLD VENIPUNCTURE: CPT | Performed by: FAMILY MEDICINE

## 2022-04-25 PROCEDURE — 83036 HEMOGLOBIN GLYCOSYLATED A1C: CPT | Performed by: FAMILY MEDICINE

## 2022-04-25 ASSESSMENT — ASTHMA QUESTIONNAIRES
QUESTION_2 LAST FOUR WEEKS HOW OFTEN HAVE YOU HAD SHORTNESS OF BREATH: MORE THAN ONCE A DAY
QUESTION_5 LAST FOUR WEEKS HOW WOULD YOU RATE YOUR ASTHMA CONTROL: SOMEWHAT CONTROLLED
QUESTION_4 LAST FOUR WEEKS HOW OFTEN HAVE YOU USED YOUR RESCUE INHALER OR NEBULIZER MEDICATION (SUCH AS ALBUTEROL): TWO OR THREE TIMES PER WEEK
QUESTION_1 LAST FOUR WEEKS HOW MUCH OF THE TIME DID YOUR ASTHMA KEEP YOU FROM GETTING AS MUCH DONE AT WORK, SCHOOL OR AT HOME: A LITTLE OF THE TIME
ACT_TOTALSCORE: 15
ACT_TOTALSCORE: 15
QUESTION_3 LAST FOUR WEEKS HOW OFTEN DID YOUR ASTHMA SYMPTOMS (WHEEZING, COUGHING, SHORTNESS OF BREATH, CHEST TIGHTNESS OR PAIN) WAKE YOU UP AT NIGHT OR EARLIER THAN USUAL IN THE MORNING: ONCE OR TWICE

## 2022-04-25 ASSESSMENT — PAIN SCALES - GENERAL: PAINLEVEL: SEVERE PAIN (6)

## 2022-04-28 ENCOUNTER — TELEPHONE (OUTPATIENT)
Dept: GASTROENTEROLOGY | Facility: CLINIC | Age: 42
End: 2022-04-28
Payer: COMMERCIAL

## 2022-04-28 NOTE — TELEPHONE ENCOUNTER
Screening Questions  BlueKIND OF PREP RedLOCATION [review exclusion criteria] GreenSEDATION TYPE  1. Have you had a positive covid test in the last 90 days? n     2. Do you have a legal guardian or medical Power of ?  Are you able to give consent for your medical care?y (Sedation review/consideration needed)    3. Are you active on mychart? y    4. What insurance is in the chart? BXBS     3.   Ordering/Referring Provider: Юлия    4. BMI 44.4 [BMI OVER 40-EXTENDED PREP]  If greater than 40 review exclusion criteria [PAC APPT IF @ [facility]]        5.  Respiratory Screening :  [If yes to any of the following HOSPITAL setting only]     Do you use daily home oxygen? n    Do you have mod to severe Obstructive Sleep Apnea? n  [OKAY @ [facility] SH PH RI]   Do you have Pulmonary Hypertension? n     Do you have UNCONTROLLED asthma? n        6.   Have you had a heart or lung transplant? n      7.   Are you currently on dialysis? n [ If yes, G-PREP & HOSPITAL setting only]     8.   Do you have chronic kidney disease? n [ If yes, G-PREP ]    9.   Have you had a stroke or Transient ischemic attack (TIA - aka  mini stroke ) within 6 months?  n (If yes, please review exclusion criteria)    10.   In the past 6 months, have you had any heart related issues including cardiomyopathy or heart attack? n           If yes, did it require cardiac stenting or other implantable device? n      11.   Do you have any implantable devices in your body (pacemaker, defib, LVAD)? n (If yes, please review exclusion criteria)    12.   Do you take nitroglycerin? n           If yes, how often? n  (if yes, HOSPITAL setting ONLY)    13.   Are you currently taking any blood thinners? n           [IF YES, INFORM PATIENT TO FOLLOW UP W/ ORDERING PROVIDER FOR BRIDGING INSTRUCTIONS]     14.   Do you have a diagnosis of diabetes? n   [ If yes, G-PREP ]    15.   [FEMALES] Are you currently pregnant?     If yes, how many weeks?     16.   Are you taking  any prescription pain medications on a routine schedule?  n  [ If yes, EXTENDED PREP.] [If yes, MAC]    17.   Do you have any chemical dependencies such as alcohol, street drugs, or methadone?  n [If yes, MAC]    18.   Do you have any history of post-traumatic stress syndrome, severe anxiety or history of psychosis?  n  [If yes, MAC]    19.   Do you transfer independently?  y    20.  On a regular basis do you go 3-5 days between bowel movements? n   [ If yes, EXTENDED PREP.]    21.   Preferred LOCAL Pharmacy for Pre Prescription      [id number] [place] [facility] [place] - [address] [place], [place] [id number] - [place], [place] - [address] [facility]      Scheduling Details      Caller : Angela Crawford  (Please ask for phone number if not scheduled by patient)    Type of Procedure Scheduled: EGD  Which Colonoscopy Prep was Sent?:   ELIDIA CF PATIENTS & GROEN'S PATIENTS NEEDS EXTENDED PREP  Surgeon: Marvin  Date of Procedure: 5/26  Location: [facility]      Sedation Type: CS  Conscious Sedation- Needs  for 6 hours after the procedure  MAC/General-Needs  for 24 hours after procedure    Pre-op Required at [facility] [place], [place] and OR for MAC sedation: n  (advise patient they will need a pre-op prior to procedure -)      Informed patient they will need an adult  y  Cannot take any type of public or medical transportation alone    Pre-Procedure Covid test to be completed at [facility] or Externally: y    Confirmed Nurse will call to complete assessment y    Additional comments:

## 2022-04-29 DIAGNOSIS — Z11.59 ENCOUNTER FOR SCREENING FOR OTHER VIRAL DISEASES: Primary | ICD-10-CM

## 2022-05-03 ENCOUNTER — ANCILLARY PROCEDURE (OUTPATIENT)
Dept: ULTRASOUND IMAGING | Facility: OTHER | Age: 42
End: 2022-05-03
Attending: FAMILY MEDICINE
Payer: COMMERCIAL

## 2022-05-03 DIAGNOSIS — R10.11 RUQ ABDOMINAL PAIN: ICD-10-CM

## 2022-05-03 PROCEDURE — 76700 US EXAM ABDOM COMPLETE: CPT | Mod: TC | Performed by: RADIOLOGY

## 2022-05-08 ENCOUNTER — HEALTH MAINTENANCE LETTER (OUTPATIENT)
Age: 42
End: 2022-05-08

## 2022-05-20 ENCOUNTER — MYC MEDICAL ADVICE (OUTPATIENT)
Dept: FAMILY MEDICINE | Facility: OTHER | Age: 42
End: 2022-05-20
Payer: COMMERCIAL

## 2022-05-20 NOTE — TELEPHONE ENCOUNTER
Pt was seen yesterday for symptoms. See visit through Care Everywhere. She has already stated that if symptoms worsen she will go to ED. Looking for referrals from PCP. Please advise. Responded to Tavo.    Verito Gregg, DARSHANAN, RN, PHN  Registered Nurse-Clinic Triage  [facility]  5/20/2022 at 11:26 AM

## 2022-05-22 NOTE — TELEPHONE ENCOUNTER
Reviewed notes from [place] and no urgent findings on exam. Without change this does not qualify for a same day, though I will review schedule after more urgent requests to see if we can see her earlier.  Booked today, would be able to use approval req Wed or Thurs for evaluation again.  Angela Redman MD

## 2022-05-23 ENCOUNTER — LAB (OUTPATIENT)
Dept: LAB | Facility: OTHER | Age: 42
End: 2022-05-23
Payer: COMMERCIAL

## 2022-05-23 DIAGNOSIS — Z11.59 ENCOUNTER FOR SCREENING FOR OTHER VIRAL DISEASES: ICD-10-CM

## 2022-05-23 PROCEDURE — U0005 INFEC AGEN DETEC AMPLI PROBE: HCPCS

## 2022-05-23 PROCEDURE — U0003 INFECTIOUS AGENT DETECTION BY NUCLEIC ACID (DNA OR RNA); SEVERE ACUTE RESPIRATORY SYNDROME CORONAVIRUS 2 (SARS-COV-2) (CORONAVIRUS DISEASE [COVID-19]), AMPLIFIED PROBE TECHNIQUE, MAKING USE OF HIGH THROUGHPUT TECHNOLOGIES AS DESCRIBED BY CMS-2020-01-R: HCPCS

## 2022-05-24 LAB — SARS-COV-2 RNA RESP QL NAA+PROBE: NEGATIVE

## 2022-05-25 ENCOUNTER — OFFICE VISIT (OUTPATIENT)
Dept: FAMILY MEDICINE | Facility: OTHER | Age: 42
End: 2022-05-25
Payer: COMMERCIAL

## 2022-05-25 VITALS
HEART RATE: 76 BPM | OXYGEN SATURATION: 99 % | SYSTOLIC BLOOD PRESSURE: 123 MMHG | BODY MASS INDEX: 43.32 KG/M2 | DIASTOLIC BLOOD PRESSURE: 84 MMHG | TEMPERATURE: 98.7 F | HEIGHT: 65 IN | WEIGHT: 260 LBS

## 2022-05-25 DIAGNOSIS — F32.1 MODERATE MAJOR DEPRESSION (H): ICD-10-CM

## 2022-05-25 DIAGNOSIS — G25.81 RESTLESS LEGS SYNDROME (RLS): ICD-10-CM

## 2022-05-25 DIAGNOSIS — M79.7 FIBROMYALGIA: ICD-10-CM

## 2022-05-25 DIAGNOSIS — R20.2 PARESTHESIA: Primary | ICD-10-CM

## 2022-05-25 LAB
ALBUMIN SERPL-MCNC: 3.8 G/DL (ref 3.4–5)
ALP SERPL-CCNC: 86 U/L (ref 40–150)
ALT SERPL W P-5'-P-CCNC: 22 U/L (ref 0–50)
ANION GAP SERPL CALCULATED.3IONS-SCNC: 4 MMOL/L (ref 3–14)
AST SERPL W P-5'-P-CCNC: 17 U/L (ref 0–45)
BILIRUB SERPL-MCNC: 0.5 MG/DL (ref 0.2–1.3)
BUN SERPL-MCNC: 12 MG/DL (ref 7–30)
CALCIUM SERPL-MCNC: 8.9 MG/DL (ref 8.5–10.1)
CHLORIDE BLD-SCNC: 108 MMOL/L (ref 94–109)
CO2 SERPL-SCNC: 27 MMOL/L (ref 20–32)
CREAT SERPL-MCNC: 0.63 MG/DL (ref 0.52–1.04)
CRP SERPL-MCNC: 4.7 MG/L (ref 0–8)
ERYTHROCYTE [DISTWIDTH] IN BLOOD BY AUTOMATED COUNT: 13.3 % (ref 10–15)
ERYTHROCYTE [SEDIMENTATION RATE] IN BLOOD BY WESTERGREN METHOD: 18 MM/HR (ref 0–20)
FERRITIN SERPL-MCNC: 12 NG/ML (ref 12–150)
GFR SERPL CREATININE-BSD FRML MDRD: >90 ML/MIN/1.73M2
GLUCOSE BLD-MCNC: 93 MG/DL (ref 70–99)
HCT VFR BLD AUTO: 35 % (ref 35–47)
HGB BLD-MCNC: 11.8 G/DL (ref 11.7–15.7)
MAGNESIUM SERPL-MCNC: 1.9 MG/DL (ref 1.6–2.3)
MCH RBC QN AUTO: 29.6 PG (ref 26.5–33)
MCHC RBC AUTO-ENTMCNC: 33.7 G/DL (ref 31.5–36.5)
MCV RBC AUTO: 88 FL (ref 78–100)
PLATELET # BLD AUTO: 336 10E3/UL (ref 150–450)
POTASSIUM BLD-SCNC: 4.3 MMOL/L (ref 3.4–5.3)
PROT SERPL-MCNC: 7.8 G/DL (ref 6.8–8.8)
RBC # BLD AUTO: 3.98 10E6/UL (ref 3.8–5.2)
SODIUM SERPL-SCNC: 139 MMOL/L (ref 133–144)
TSH SERPL DL<=0.005 MIU/L-ACNC: 3.24 MU/L (ref 0.4–4)
VIT B12 SERPL-MCNC: 412 PG/ML (ref 193–986)
WBC # BLD AUTO: 5 10E3/UL (ref 4–11)

## 2022-05-25 PROCEDURE — 82607 VITAMIN B-12: CPT | Performed by: FAMILY MEDICINE

## 2022-05-25 PROCEDURE — 86140 C-REACTIVE PROTEIN: CPT | Performed by: FAMILY MEDICINE

## 2022-05-25 PROCEDURE — 36415 COLL VENOUS BLD VENIPUNCTURE: CPT | Performed by: FAMILY MEDICINE

## 2022-05-25 PROCEDURE — 82728 ASSAY OF FERRITIN: CPT | Performed by: FAMILY MEDICINE

## 2022-05-25 PROCEDURE — 80053 COMPREHEN METABOLIC PANEL: CPT | Performed by: FAMILY MEDICINE

## 2022-05-25 PROCEDURE — 83735 ASSAY OF MAGNESIUM: CPT | Performed by: FAMILY MEDICINE

## 2022-05-25 PROCEDURE — 84443 ASSAY THYROID STIM HORMONE: CPT | Performed by: FAMILY MEDICINE

## 2022-05-25 PROCEDURE — 99215 OFFICE O/P EST HI 40 MIN: CPT | Performed by: FAMILY MEDICINE

## 2022-05-25 PROCEDURE — 86038 ANTINUCLEAR ANTIBODIES: CPT | Performed by: FAMILY MEDICINE

## 2022-05-25 PROCEDURE — 85027 COMPLETE CBC AUTOMATED: CPT | Performed by: FAMILY MEDICINE

## 2022-05-25 PROCEDURE — 85652 RBC SED RATE AUTOMATED: CPT | Performed by: FAMILY MEDICINE

## 2022-05-25 RX ORDER — CYCLOBENZAPRINE HCL 10 MG
10 TABLET ORAL 3 TIMES DAILY PRN
Qty: 90 TABLET | Refills: 1 | Status: SHIPPED | OUTPATIENT
Start: 2022-05-25 | End: 2024-06-07

## 2022-05-25 RX ORDER — ESCITALOPRAM OXALATE 20 MG/1
TABLET ORAL
Qty: 90 TABLET | Refills: 1 | Status: SHIPPED | OUTPATIENT
Start: 2022-05-25 | End: 2024-06-07

## 2022-05-25 ASSESSMENT — PAIN SCALES - GENERAL: PAINLEVEL: EXTREME PAIN (8)

## 2022-05-25 NOTE — PROGRESS NOTES
Assessment & Plan       ICD-10-CM    1. Paresthesia  R20.2 ESR: Erythrocyte sedimentation rate     CRP, inflammation     Vitamin B12     Ferritin     Magnesium     Anti Nuclear Cheli IgG by IFA with Reflex     ESR: Erythrocyte sedimentation rate     CRP, inflammation     Vitamin B12     Ferritin     Magnesium     Anti Nuclear Cheli IgG by IFA with Reflex   2. Fibromyalgia  M79.7 cyclobenzaprine (FLEXERIL) 10 MG tablet     CBC with platelets     TSH with free T4 reflex     Comprehensive metabolic panel (BMP + Alb, Alk Phos, ALT, AST, Total. Bili, TP)     CBC with platelets     TSH with free T4 reflex     Comprehensive metabolic panel (BMP + Alb, Alk Phos, ALT, AST, Total. Bili, TP)   3. Restless legs syndrome (RLS)  G25.81 Ferritin     Magnesium     Ferritin     Magnesium   4. Moderate major depression (H)  F32.1 escitalopram (LEXAPRO) 20 MG tablet     Patient appears to have some cubital tunnel compression in her elbow as well as some low back pain radiculopathy on the left.  And she is discovering that keeping her arm straight while she sleeps is helpful as well as movement has been helpful for her back or repositioning.  These appear to be likely wear-and-tear arthritic type conditions that could be helpful with use of movement and physical therapy.  Though she also has all fibromyalgia pulse points positive which is likely the bigger diagnosis for her.  Lastly we will do labs to determine if there is an progressive arthritides causing her symptoms as well.  If these are positive we will plan to see rheumatology in the near future.    Review of the result(s) of each unique test - esr, crp, cbc, tsh, cmp, mag, ferritin, tsh  41 minutes spent on the date of the encounter doing chart review, history and exam, documentation and further activities per the note           No follow-ups on file.    Angela Redman MD, MD  [facility]   Angela is a 42 year old who presents for the following  "health issues     History of Present Illness       Reason for visit:  Paresthesia, fatigue, headaches, severe joint pain  Symptom onset:  3-7 days ago  Symptoms include:  Progressing numbness & tingling legs, arms, neck, shoulder, face. Coital headaches. Severe progressing michaela t pain & fatigue.  Symptom intensity:  Severe  Symptom progression:  Worsening  Had these symptoms before:  No  What makes it worse:  Exercise, sitting too long, walking, sleeping.  What makes it better:  Hot baths, ice, massage.    She eats 2-3 servings of fruits and vegetables daily.She consumes 0 sweetened beverage(s) daily.She exercises with enough effort to increase her heart rate 10 to 19 minutes per day.  She exercises with enough effort to increase her heart rate 3 or less days per week.   She is taking medications regularly.       Patient has been having migrating paresthesias which initially she had had in the right anterior thigh only and now has paresthesias in her hands elbows shoulders hips knees and ankles that rotates instantly lasts typically only minutes apiece.  She states that her hands feel more swollen as well as her ankles appear more swollen.  She mentions having some stiffness but this is hard for her to distinguish between the pain and the stiffness.  It is hard for her to do most activities due to the pain though she had been attempting to get into a regular exercise routine and thought this seemed to make it worse.  She does request images though this is difficult considering the number of locations that she is having symptoms and unlikely to be of use given how the symptoms migrate.    Review of Systems   Constitutional, HEENT, cardiovascular, pulmonary, GI, , musculoskeletal, neuro, skin, endocrine and psych systems are negative, except as otherwise noted.      Objective    /84 (BP Location: Left arm)   Pulse 76   Temp 98.7  F (37.1  C) (Oral)   Ht 1.643 m (5' 4.69\")   Wt 117.9 kg (260 lb)   LMP " 04/20/2022   SpO2 99%   BMI 43.69 kg/m    Body mass index is 43.69 kg/m .  Physical Exam  Musculoskeletal:      Lumbar back: Positive right straight leg raise test and positive left straight leg raise test.        GENERAL: healthy, alert and no distress  RESP: lungs clear to auscultation - no rales, rhonchi or wheezes  CV: regular rate and rhythm, normal S1 S2, no S3 or S4, no murmur, click or rub, no peripheral edema and peripheral pulses strong  MS: Right Ankle Exam     Tenderness   The patient is experiencing tenderness in the ATF and lateral malleolus.  Swelling: mild    Range of Motion   The patient has normal right ankle ROM.    Muscle Strength   The patient has normal right ankle strength.    Tests   Anterior drawer: negative  Varus tilt: negative    Other   Erythema: absent  Right ankle sensation: painful and sensitive.       Left Ankle Exam     Tenderness   The patient is experiencing tenderness in the ATF and lateral malleolus.   Swelling: mild    Range of Motion   The patient has normal left ankle ROM.     Muscle Strength   The patient has normal left ankle strength.    Tests   Anterior drawer: negative  Varus tilt: negative    Other   Erythema: absent    Comments:  Less painful, but also painful at medial ankle      Right Knee Exam     Muscle Strength   The patient has normal right knee strength.    Tenderness   The patient is experiencing tenderness in the medial joint line (medial knee at quad insert is painful to palpation).    Range of Motion   Extension: normal   Flexion: normal     Tests   Varus: negative Valgus: negative  Lachman:  Anterior - negative    Posterior - negative  Drawer:  Anterior - negative    Posterior - negative    Other   Erythema: absent  Swelling: none      Left Knee Exam     Muscle Strength   The patient has normal left knee strength.    Tenderness   The patient is experiencing tenderness in the medial joint line (pain at the medial quad insertion).    Range of Motion    Extension: normal   Flexion: normal     Tests   Varus: negative Valgus: negative  Lachman:  Anterior - negative    Posterior - negative  Drawer:  Anterior - negative     Posterior - negative    Other   Erythema: absent  Swelling: none      Right Hip Exam   Right hip exam is normal.     Tenderness   The patient is experiencing tenderness in the greater trochanter.    Range of Motion   The patient has normal right hip ROM.    Muscle Strength   The patient has normal right hip strength.      Left Hip Exam   Left hip exam is normal.    Tenderness   The patient is experiencing tenderness in the greater trochanter.    Range of Motion   The patient has normal left hip ROM.    Muscle Strength   The patient has normal left hip strength.       Back Exam     Tenderness   The patient is experiencing tenderness in the sacroiliac.    Range of Motion   The patient has normal back ROM.  Extension: normal   Flexion: normal   Lateral bend right: normal   Lateral bend left: normal   Rotation right: normal   Rotation left: normal     Tests   Straight leg raise right: positive  Straight leg raise left: positiveLeft straight leg raise test: to L, without radiation or numbness.    Other   Sensation: normal  Gait: stiff.       Right Hand Exam     Tenderness   Right hand tenderness location: appear stiff and swollen.    Range of Motion   The patient has normal right wrist ROM.     Muscle Strength   The patient has normal right wrist strength.    Other   Pulse: present      Left Hand Exam     Tenderness   Left hand tenderness location: appear stiff and swollen.     Range of Motion   The patient has normal left wrist ROM.    Muscle Strength   The patient has normal left wrist strength.    Other   Pulse: present      Right Elbow Exam     Tenderness   Right elbow tenderness location: cubital tunnel compression causes symptoms.     Range of Motion   The patient has normal right elbow ROM.    Muscle Strength   The patient has normal right elbow  strength.      Left Elbow Exam     Tenderness   Left elbow tenderness location: cupital tunnel compression causes symptoms.     Range of Motion   The patient has normal left elbow ROM.    Muscle Strength   The patient has normal left elbow strength.            SKIN: no suspicious lesions or rashes  NEURO: Normal strength and tone, mentation intact and speech normal  PSYCH: mentation appears anxious

## 2022-05-26 ENCOUNTER — HOSPITAL ENCOUNTER (OUTPATIENT)
Facility: AMBULATORY SURGERY CENTER | Age: 42
Discharge: HOME | End: 2022-05-26
Attending: INTERNAL MEDICINE | Admitting: INTERNAL MEDICINE
Payer: COMMERCIAL

## 2022-05-26 VITALS
DIASTOLIC BLOOD PRESSURE: 89 MMHG | RESPIRATION RATE: 16 BRPM | SYSTOLIC BLOOD PRESSURE: 137 MMHG | OXYGEN SATURATION: 97 % | TEMPERATURE: 99.5 F | HEART RATE: 88 BPM

## 2022-05-26 LAB
ANA SER QL IF: NEGATIVE
UPPER GI ENDOSCOPY: NORMAL

## 2022-05-26 PROCEDURE — 88305 TISSUE EXAM BY PATHOLOGIST: CPT | Performed by: PATHOLOGY

## 2022-05-26 PROCEDURE — G8918 PT W/O PREOP ORDER IV AB PRO: HCPCS

## 2022-05-26 PROCEDURE — G8907 PT DOC NO EVENTS ON DISCHARG: HCPCS

## 2022-05-26 PROCEDURE — 43249 ESOPH EGD DILATION <30 MM: CPT

## 2022-05-26 PROCEDURE — 43239 EGD BIOPSY SINGLE/MULTIPLE: CPT | Mod: 59

## 2022-05-26 RX ORDER — ONDANSETRON 2 MG/ML
4 INJECTION INTRAMUSCULAR; INTRAVENOUS EVERY 6 HOURS PRN
Status: DISCONTINUED | OUTPATIENT
Start: 2022-05-26 | End: 2022-05-27 | Stop reason: HOSPADM

## 2022-05-26 RX ORDER — PROCHLORPERAZINE MALEATE 10 MG
10 TABLET ORAL EVERY 6 HOURS PRN
Status: DISCONTINUED | OUTPATIENT
Start: 2022-05-26 | End: 2022-05-27 | Stop reason: HOSPADM

## 2022-05-26 RX ORDER — NALOXONE HYDROCHLORIDE 0.4 MG/ML
0.2 INJECTION, SOLUTION INTRAMUSCULAR; INTRAVENOUS; SUBCUTANEOUS
Status: DISCONTINUED | OUTPATIENT
Start: 2022-05-26 | End: 2022-05-27 | Stop reason: HOSPADM

## 2022-05-26 RX ORDER — NALOXONE HYDROCHLORIDE 0.4 MG/ML
0.4 INJECTION, SOLUTION INTRAMUSCULAR; INTRAVENOUS; SUBCUTANEOUS
Status: DISCONTINUED | OUTPATIENT
Start: 2022-05-26 | End: 2022-05-27 | Stop reason: HOSPADM

## 2022-05-26 RX ORDER — ONDANSETRON 2 MG/ML
4 INJECTION INTRAMUSCULAR; INTRAVENOUS
Status: DISCONTINUED | OUTPATIENT
Start: 2022-05-26 | End: 2022-05-27 | Stop reason: HOSPADM

## 2022-05-26 RX ORDER — ONDANSETRON 4 MG/1
4 TABLET, ORALLY DISINTEGRATING ORAL EVERY 6 HOURS PRN
Status: DISCONTINUED | OUTPATIENT
Start: 2022-05-26 | End: 2022-05-27 | Stop reason: HOSPADM

## 2022-05-26 RX ORDER — FLUMAZENIL 0.1 MG/ML
0.2 INJECTION, SOLUTION INTRAVENOUS
Status: ACTIVE | OUTPATIENT
Start: 2022-05-26 | End: 2022-05-26

## 2022-05-26 RX ORDER — LIDOCAINE 40 MG/G
CREAM TOPICAL
Status: DISCONTINUED | OUTPATIENT
Start: 2022-05-26 | End: 2022-05-27 | Stop reason: HOSPADM

## 2022-05-26 RX ORDER — SODIUM CHLORIDE, SODIUM LACTATE, POTASSIUM CHLORIDE, CALCIUM CHLORIDE 600; 310; 30; 20 MG/100ML; MG/100ML; MG/100ML; MG/100ML
INJECTION, SOLUTION INTRAVENOUS CONTINUOUS
Status: DISCONTINUED | OUTPATIENT
Start: 2022-05-26 | End: 2022-05-27 | Stop reason: HOSPADM

## 2022-05-26 RX ORDER — FENTANYL CITRATE 50 UG/ML
INJECTION, SOLUTION INTRAMUSCULAR; INTRAVENOUS PRN
Status: DISCONTINUED | OUTPATIENT
Start: 2022-05-26 | End: 2022-05-26 | Stop reason: HOSPADM

## 2022-05-26 RX ADMIN — SODIUM CHLORIDE, SODIUM LACTATE, POTASSIUM CHLORIDE, CALCIUM CHLORIDE: 600; 310; 30; 20 INJECTION, SOLUTION INTRAVENOUS at 11:00

## 2022-05-26 NOTE — H&P
ENDOSCOPY PRE-SEDATION H&P FOR OUTPATIENT PROCEDURES    Angela Crawford  [id number]  1980    Procedure: EGD    Pre-procedure diagnosis: dysphagia    Past medical history:   Past Medical History:   Diagnosis Date     Degenerative disc disease, lumbar      Fibromyalgia      Migraines        Past surgical history:   Past Surgical History:   Procedure Laterality Date      SECTION      x3       Current Outpatient Medications   Medication     escitalopram (LEXAPRO) 20 MG tablet     albuterol (PROAIR HFA/PROVENTIL HFA/VENTOLIN HFA) 108 (90 Base) MCG/ACT inhaler     ALPRAZolam (XANAX) 0.5 MG tablet     cyclobenzaprine (FLEXERIL) 10 MG tablet     Multiple Vitamins-Minerals (MULTIVITAMIN OR)     omeprazole (PRILOSEC) 40 MG DR capsule     Current Facility-Administered Medications   Medication     flumazenil (ROMAZICON) injection 0.2 mg     lactated ringers infusion     lidocaine (LMX4) kit     lidocaine 1 % 0.1-1 mL     naloxone (NARCAN) injection 0.2 mg     naloxone (NARCAN) injection 0.2 mg     naloxone (NARCAN) injection 0.4 mg     naloxone (NARCAN) injection 0.4 mg     ondansetron (ZOFRAN ODT) ODT tab 4 mg    Or     ondansetron (ZOFRAN) injection 4 mg     ondansetron (ZOFRAN) injection 4 mg     prochlorperazine (COMPAZINE) injection 10 mg    Or     prochlorperazine (COMPAZINE) tablet 10 mg     sodium chloride (PF) 0.9% PF flush 3 mL     sodium chloride (PF) 0.9% PF flush 3 mL       Allergies   Allergen Reactions     Seasonal Allergies        History of Anesthesia/Sedation Problems: no    Physical Exam:    Mental status: alert  Heart: Normal  Lung: Normal  Assessment of patient's airway: Normal  Other as pertinent for procedure: None     ASA Score: See Provation note    Mallampati score:  II - Faucial pillars and soft palate may be seen, but uvula is masked by the base of the tongue    Assessment/Plan:     The patient is an appropriate candidate to receive sedation.    Informed consent was discussed with the  patient/family, including the risks, benefits, potential complications and any alternative options associated with sedation.    Patient assessment completed just prior to sedation and while under constant observation by the provider. Condition determined to be adequate for proceeding with sedation.    The specific risks for the procedure were discussed with the patient at the time of informed consent and include but are not limited to perforation which could require surgery, missing significant neoplasm or lesion, hemorrhage and adverse sedative complication.      Rambo Wong MD

## 2022-05-31 LAB
PATH REPORT.COMMENTS IMP SPEC: NORMAL
PATH REPORT.COMMENTS IMP SPEC: NORMAL
PATH REPORT.FINAL DX SPEC: NORMAL
PATH REPORT.GROSS SPEC: NORMAL
PATH REPORT.MICROSCOPIC SPEC OTHER STN: NORMAL
PATH REPORT.RELEVANT HX SPEC: NORMAL
PHOTO IMAGE: NORMAL

## 2022-06-07 ENCOUNTER — TRANSFERRED RECORDS (OUTPATIENT)
Dept: HEALTH INFORMATION MANAGEMENT | Facility: CLINIC | Age: 42
End: 2022-06-07
Payer: COMMERCIAL

## 2022-06-07 ENCOUNTER — TELEPHONE (OUTPATIENT)
Dept: FAMILY MEDICINE | Facility: OTHER | Age: 42
End: 2022-06-07
Payer: COMMERCIAL

## 2022-06-07 NOTE — TELEPHONE ENCOUNTER
Reason for Call:  Form, our goal is to have forms completed with 72 hours, however, some forms may require a visit or additional information.    Type of letter, form or note:  medical    Who is the form from?: [facility]  (if other please explain)    Where did the form come from: form was faxed in    What clinic location was the form placed at?: [facility] [phone]    Where the form was placed: team c bin     What number is listed as a contact on the form?: fax [phone]       Additional comments: please complete and fax     Call taken on 6/7/2022 at 8:45 AM by Sybil Martinez

## 2022-07-15 ENCOUNTER — TRANSFERRED RECORDS (OUTPATIENT)
Dept: HEALTH INFORMATION MANAGEMENT | Facility: CLINIC | Age: 42
End: 2022-07-15

## 2022-07-15 ENCOUNTER — TELEPHONE (OUTPATIENT)
Dept: FAMILY MEDICINE | Facility: OTHER | Age: 42
End: 2022-07-15

## 2022-07-15 NOTE — TELEPHONE ENCOUNTER
Reason for Call:  Form, our goal is to have forms completed with 72 hours, however, some forms may require a visit or additional information.    Type of letter, form or note:  medical    Who is the form from?: [facility]  (if other please explain)    Where did the form come from: form was faxed in    What clinic location was the form placed at?: [facility] [phone]    Where the form was placed: Team C bin     What number is listed as a contact on the form?: fax [phone]       Additional comments: please complete and fax     Call taken on 7/15/2022 at 4:06 PM by Sybil Martinez

## 2022-07-15 NOTE — TELEPHONE ENCOUNTER
Form placed in providers bin for review and signature if appropriate  Veronica Galdamez MA on 7/15/2022 at 4:17 PM

## 2022-09-06 ENCOUNTER — TRANSFERRED RECORDS (OUTPATIENT)
Dept: HEALTH INFORMATION MANAGEMENT | Facility: CLINIC | Age: 42
End: 2022-09-06

## 2023-01-14 ENCOUNTER — HEALTH MAINTENANCE LETTER (OUTPATIENT)
Age: 43
End: 2023-01-14

## 2023-04-23 ENCOUNTER — HEALTH MAINTENANCE LETTER (OUTPATIENT)
Age: 43
End: 2023-04-23

## 2023-06-02 ENCOUNTER — HEALTH MAINTENANCE LETTER (OUTPATIENT)
Age: 43
End: 2023-06-02

## 2024-03-15 ENCOUNTER — TELEPHONE (OUTPATIENT)
Dept: FAMILY MEDICINE | Facility: OTHER | Age: 44
End: 2024-03-15
Payer: COMMERCIAL

## 2024-03-15 NOTE — TELEPHONE ENCOUNTER
Per provider, patient's appointment needs to be rescheduled due to provider out.    Please reschedule patient in the next available Same Day or Approval Required slot.    Cancelled appointment with Felice Arellano PA-C on 4/9/2024 at 1240/100.     Appointment was cancelled and encounter created for message trail.    [email or web address] was sent to inform patient of this. 3/15/2024.

## 2024-06-06 ASSESSMENT — PATIENT HEALTH QUESTIONNAIRE - PHQ9
10. IF YOU CHECKED OFF ANY PROBLEMS, HOW DIFFICULT HAVE THESE PROBLEMS MADE IT FOR YOU TO DO YOUR WORK, TAKE CARE OF THINGS AT HOME, OR GET ALONG WITH OTHER PEOPLE: SOMEWHAT DIFFICULT
SUM OF ALL RESPONSES TO PHQ QUESTIONS 1-9: 11
SUM OF ALL RESPONSES TO PHQ QUESTIONS 1-9: 11

## 2024-06-06 ASSESSMENT — ASTHMA QUESTIONNAIRES
QUESTION_5 LAST FOUR WEEKS HOW WOULD YOU RATE YOUR ASTHMA CONTROL: SOMEWHAT CONTROLLED
QUESTION_1 LAST FOUR WEEKS HOW MUCH OF THE TIME DID YOUR ASTHMA KEEP YOU FROM GETTING AS MUCH DONE AT WORK, SCHOOL OR AT HOME: A LITTLE OF THE TIME
QUESTION_3 LAST FOUR WEEKS HOW OFTEN DID YOUR ASTHMA SYMPTOMS (WHEEZING, COUGHING, SHORTNESS OF BREATH, CHEST TIGHTNESS OR PAIN) WAKE YOU UP AT NIGHT OR EARLIER THAN USUAL IN THE MORNING: ONCE OR TWICE
ACT_TOTALSCORE: 19
ACT_TOTALSCORE: 19
QUESTION_4 LAST FOUR WEEKS HOW OFTEN HAVE YOU USED YOUR RESCUE INHALER OR NEBULIZER MEDICATION (SUCH AS ALBUTEROL): ONCE A WEEK OR LESS
QUESTION_2 LAST FOUR WEEKS HOW OFTEN HAVE YOU HAD SHORTNESS OF BREATH: ONCE OR TWICE A WEEK

## 2024-06-07 ENCOUNTER — OFFICE VISIT (OUTPATIENT)
Dept: FAMILY MEDICINE | Facility: OTHER | Age: 44
End: 2024-06-07
Payer: COMMERCIAL

## 2024-06-07 VITALS
HEART RATE: 85 BPM | SYSTOLIC BLOOD PRESSURE: 110 MMHG | RESPIRATION RATE: 16 BRPM | WEIGHT: 252 LBS | BODY MASS INDEX: 43.02 KG/M2 | OXYGEN SATURATION: 99 % | TEMPERATURE: 98.2 F | HEIGHT: 64 IN | DIASTOLIC BLOOD PRESSURE: 78 MMHG

## 2024-06-07 DIAGNOSIS — R30.0 DYSURIA: ICD-10-CM

## 2024-06-07 DIAGNOSIS — Z12.11 SPECIAL SCREENING FOR MALIGNANT NEOPLASMS, COLON: ICD-10-CM

## 2024-06-07 DIAGNOSIS — Z12.31 VISIT FOR SCREENING MAMMOGRAM: ICD-10-CM

## 2024-06-07 DIAGNOSIS — N92.0 EXCESSIVE AND FREQUENT MENSTRUATION: Primary | ICD-10-CM

## 2024-06-07 DIAGNOSIS — M77.12 LATERAL EPICONDYLITIS OF LEFT ELBOW: ICD-10-CM

## 2024-06-07 PROBLEM — R12 HEARTBURN: Status: RESOLVED | Noted: 2021-06-25 | Resolved: 2024-06-07

## 2024-06-07 PROBLEM — J45.909 ASTHMA: Status: ACTIVE | Noted: 2021-06-25

## 2024-06-07 LAB
ALBUMIN UR-MCNC: NEGATIVE MG/DL
ANION GAP SERPL CALCULATED.3IONS-SCNC: 10 MMOL/L (ref 7–15)
APPEARANCE UR: CLEAR
BILIRUB UR QL STRIP: NEGATIVE
BUN SERPL-MCNC: 12.7 MG/DL (ref 6–20)
CALCIUM SERPL-MCNC: 8.9 MG/DL (ref 8.6–10)
CHLORIDE SERPL-SCNC: 101 MMOL/L (ref 98–107)
COLOR UR AUTO: YELLOW
CREAT SERPL-MCNC: 0.64 MG/DL (ref 0.51–0.95)
DEPRECATED HCO3 PLAS-SCNC: 22 MMOL/L (ref 22–29)
EGFRCR SERPLBLD CKD-EPI 2021: >90 ML/MIN/1.73M2
ERYTHROCYTE [DISTWIDTH] IN BLOOD BY AUTOMATED COUNT: 13.4 % (ref 10–15)
GLUCOSE SERPL-MCNC: 97 MG/DL (ref 70–99)
GLUCOSE UR STRIP-MCNC: NEGATIVE MG/DL
HCT VFR BLD AUTO: 35.6 % (ref 35–47)
HGB BLD-MCNC: 11.4 G/DL (ref 11.7–15.7)
HGB UR QL STRIP: ABNORMAL
KETONES UR STRIP-MCNC: NEGATIVE MG/DL
LEUKOCYTE ESTERASE UR QL STRIP: NEGATIVE
MCH RBC QN AUTO: 29.7 PG (ref 26.5–33)
MCHC RBC AUTO-ENTMCNC: 32 G/DL (ref 31.5–36.5)
MCV RBC AUTO: 93 FL (ref 78–100)
NITRATE UR QL: NEGATIVE
PH UR STRIP: 6.5 [PH] (ref 5–7)
PLATELET # BLD AUTO: 382 10E3/UL (ref 150–450)
POTASSIUM SERPL-SCNC: 4 MMOL/L (ref 3.4–5.3)
RBC # BLD AUTO: 3.84 10E6/UL (ref 3.8–5.2)
RBC #/AREA URNS AUTO: ABNORMAL /HPF
SODIUM SERPL-SCNC: 133 MMOL/L (ref 135–145)
SP GR UR STRIP: 1.01 (ref 1–1.03)
SQUAMOUS #/AREA URNS AUTO: ABNORMAL /LPF
TSH SERPL DL<=0.005 MIU/L-ACNC: 3.19 UIU/ML (ref 0.3–4.2)
UROBILINOGEN UR STRIP-ACNC: 0.2 E.U./DL
WBC # BLD AUTO: 8.2 10E3/UL (ref 4–11)
WBC #/AREA URNS AUTO: ABNORMAL /HPF

## 2024-06-07 PROCEDURE — 84443 ASSAY THYROID STIM HORMONE: CPT | Performed by: FAMILY MEDICINE

## 2024-06-07 PROCEDURE — 85027 COMPLETE CBC AUTOMATED: CPT | Performed by: FAMILY MEDICINE

## 2024-06-07 PROCEDURE — 36415 COLL VENOUS BLD VENIPUNCTURE: CPT | Performed by: FAMILY MEDICINE

## 2024-06-07 PROCEDURE — 81001 URINALYSIS AUTO W/SCOPE: CPT | Performed by: FAMILY MEDICINE

## 2024-06-07 PROCEDURE — 99214 OFFICE O/P EST MOD 30 MIN: CPT | Performed by: FAMILY MEDICINE

## 2024-06-07 PROCEDURE — 80048 BASIC METABOLIC PNL TOTAL CA: CPT | Performed by: FAMILY MEDICINE

## 2024-06-07 ASSESSMENT — ANXIETY QUESTIONNAIRES
6. BECOMING EASILY ANNOYED OR IRRITABLE: NOT AT ALL
GAD7 TOTAL SCORE: 6
IF YOU CHECKED OFF ANY PROBLEMS ON THIS QUESTIONNAIRE, HOW DIFFICULT HAVE THESE PROBLEMS MADE IT FOR YOU TO DO YOUR WORK, TAKE CARE OF THINGS AT HOME, OR GET ALONG WITH OTHER PEOPLE: SOMEWHAT DIFFICULT
7. FEELING AFRAID AS IF SOMETHING AWFUL MIGHT HAPPEN: SEVERAL DAYS
4. TROUBLE RELAXING: MORE THAN HALF THE DAYS
5. BEING SO RESTLESS THAT IT IS HARD TO SIT STILL: NOT AT ALL
2. NOT BEING ABLE TO STOP OR CONTROL WORRYING: SEVERAL DAYS
8. IF YOU CHECKED OFF ANY PROBLEMS, HOW DIFFICULT HAVE THESE MADE IT FOR YOU TO DO YOUR WORK, TAKE CARE OF THINGS AT HOME, OR GET ALONG WITH OTHER PEOPLE?: SOMEWHAT DIFFICULT
GAD7 TOTAL SCORE: 6
1. FEELING NERVOUS, ANXIOUS, OR ON EDGE: SEVERAL DAYS
7. FEELING AFRAID AS IF SOMETHING AWFUL MIGHT HAPPEN: SEVERAL DAYS
3. WORRYING TOO MUCH ABOUT DIFFERENT THINGS: SEVERAL DAYS

## 2024-06-07 ASSESSMENT — PAIN SCALES - GENERAL: PAINLEVEL: NO PAIN (0)

## 2024-06-07 NOTE — PROGRESS NOTES
Assessment & Plan     Excessive and frequent menstruation  Patient had regular periods up until last September.  Then she went 5 months until her next period.  Denies hot flashes.  She then had another normal.  A month later and then went 6 weeks before her next 1 and has had irregular bleeding pattern since then for the last 6 weeks.  Currently the bleeding is light.  We did discuss possibility of perimenopausal symptoms as well as endometrial disorder, thyroid disorder and concern for possible anemia.  She does have a gynecology appointment already scheduled for next week.  Will obtain labs.  Will schedule for pelvic ultrasound and she will follow-up with gynecology.    - US Pelvic Complete with Transvaginal; Future  - TSH with free T4 reflex  - CBC with platelets  - Basic metabolic panel  (Ca, Cl, CO2, Creat, Gluc, K, Na, BUN)    Dysuria  Will check urine today.  Did discuss with the bleeding and the use of the pad she could have irritation just from that.  Would not be surprised if there was blood in her urine secondary to her vaginal bleeding.  However would like to assess for possible infection.    - UA Macroscopic with reflex to Microscopic and Culture - Lab Collect    Special screening for malignant neoplasms, colon  She is agreeable to colonoscopy screening.    - Colonoscopy Screening  Referral; Future    Visit for screening mammogram  She is agreeable to start mammograms.  Order placed.    - MA Screen Bilateral w/Harlan; Future    Lateral epicondylitis of left elbow  Gave information on epicondylitis and home exercises.  Will also refer to physical therapy.    - Physical Therapy  Referral; Future    Subjective   Angela is a 44 year old, presenting for the following health issues:  Menstrual Problem      6/7/2024     2:13 PM   Additional Questions   Roomed by ramone garza     History of Present Illness       Reason for visit:  Prolonged Menstrual Bleeding (26 days)  Symptom onset:  3-4 weeks  ago  Symptoms include:  26 days of menstrual bleeding with large clots. Extreme fatigue. Pain in abdomen, backs, legs, & left arm. Sore & tight muscles. Body aches. Constipation,  frequent & urgent urinating, poor sleep, dizzy, feel dehydrated.  Symptom intensity:  Moderate  Symptom progression:  Worsening  Had these symptoms before:  No  What makes it worse:  Being active.  What makes it better:  Ice or heat for pain. Resting.    She eats 2-3 servings of fruits and vegetables daily.She consumes 0 sweetened beverage(s) daily.She exercises with enough effort to increase her heart rate 10 to 19 minutes per day.  She exercises with enough effort to increase her heart rate 4 days per week.   She is taking medications regularly.     Had been getting periods monthly until September.  Then after September didn't have another period until February.  During this time felt great, stopped Lexapro, mood was great, lost weight.  After periods came back, then weight came back and mood got worse.  Then had it again a month later, normal 7 days, then went about 40 days before having it again in May.  On May 13th had dark brown spotting for 4 days, used a pad daily, then 17-27th seemed to have her normal period with normal small clots.  Then May 28-June 1st was lighter, went without pad for 2 nights, but got up in the morning and would have blood.  Then June 2nd to now, feels she is getting really big clots coming out.  Today it is not so bad, back to a light period.    No hot flashes.  Mild intermittent dizzy spells, better after closing her eyes.      Also since May 1 has had left lateral elbow pain.  Denies any injury.  She does like gardening and states gardening has been difficult because of the pain.  Pain travels up her arm.  Denies numbness and tingling.      Objective    /78 (BP Location: Right arm, Patient Position: Sitting, Cuff Size: Adult Regular)   Pulse 85   Temp 98.2  F (36.8  C) (Temporal)   Resp 16   Ht  "1.626 m (5' 4\")   Wt 114.3 kg (252 lb)   LMP 05/13/2024   SpO2 99%   BMI 43.26 kg/m    Body mass index is 43.26 kg/m .  Physical Exam   Gen: no apparent distress  Chest: clear to auscultation without wheeze, rale or rhonchi  Cor: regular rate and rhythm without murmur  ABDOMEN: soft, mild tenderness throughout without rebound or guarding no masses and bowel sounds normal  Ext: Left arm with tenderness to palpation over the lateral epicondyles.  She does have pain with resisted pronation.  Strength intact.  Sensation intact.  No erythema or swelling of the elbow.  Psych: Alert and oriented times 3; coherent speech, normal   rate and volume, able to articulate logical thoughts, able   to abstract reason, no tangential thoughts, no hallucinations   or delusions  Her affect is neutral          Signed Electronically by: Katie Berger MD    "

## 2024-06-11 ENCOUNTER — ANCILLARY PROCEDURE (OUTPATIENT)
Dept: ULTRASOUND IMAGING | Facility: OTHER | Age: 44
End: 2024-06-11
Attending: FAMILY MEDICINE
Payer: COMMERCIAL

## 2024-06-11 DIAGNOSIS — N92.0 EXCESSIVE AND FREQUENT MENSTRUATION: ICD-10-CM

## 2024-06-11 PROCEDURE — 76830 TRANSVAGINAL US NON-OB: CPT | Mod: TC | Performed by: RADIOLOGY

## 2024-06-11 PROCEDURE — 76856 US EXAM PELVIC COMPLETE: CPT | Mod: TC | Performed by: RADIOLOGY

## 2024-06-13 NOTE — PATIENT INSTRUCTIONS
If you have labs or imaging done, the results will automatically release in [employer] without an interpretation.  Your health care professional will review those results and send an interpretation with recommendations as soon as possible, but this may be 1-3 business days.    If you have any questions regarding your visit, please contact your care team.     [employer] [facility]: [phone]  [facility] CLINIC HOURS TELEPHONE NUMBER   [address], [facility] [place]-NOEL Amos-NOEL [facility]-Surgery Scheduler  Prema-       Monday- [place]  8:00 am-4:00 pm    Tuesday- [place]  8:00 am-4:00 pm    Wednesday- [place] 8:00 am-4:00 pm    Thursday- [place] 8:00 am-4:00 pm    Friday- [place]  8:00 am-4:00 pm [facility]  [address] [place]  [place] [address]  PH: [phone]  Fax: [phone]    Imaging Scheduling all locations  PH: [phone]     [facility]  [address] [place] [address] [address]  [place], [place] [address] [phone]    [facility]  [address] [place] [address]  PH: [phone]     **Surgeries** Our Surgery Schedulers will contact you to schedule. If you do not receive a call within 3 business days, please call [phone].  Urgent Care locations:  [facility]       Monday-Friday   10 am - 8 pm    Saturday and Sunday   9 am - 5 pm   [phone]   If you need a medication refill, please contact your pharmacy. Please allow 3 business days for your refill to be completed.  As always, Thank you for trusting us with your healthcare needs!

## 2024-06-14 ENCOUNTER — OFFICE VISIT (OUTPATIENT)
Dept: OBGYN | Facility: CLINIC | Age: 44
End: 2024-06-14
Payer: COMMERCIAL

## 2024-06-14 VITALS
SYSTOLIC BLOOD PRESSURE: 134 MMHG | BODY MASS INDEX: 42.76 KG/M2 | HEIGHT: 64 IN | WEIGHT: 250.5 LBS | DIASTOLIC BLOOD PRESSURE: 86 MMHG

## 2024-06-14 DIAGNOSIS — L90.0 LICHEN SCLEROSUS: ICD-10-CM

## 2024-06-14 DIAGNOSIS — N93.9 ABNORMAL UTERINE BLEEDING (AUB): Primary | ICD-10-CM

## 2024-06-14 LAB
CLUE CELLS: ABNORMAL
TRICHOMONAS, WET PREP: ABNORMAL
WBC'S/HIGH POWER FIELD, WET PREP: ABNORMAL
YEAST, WET PREP: ABNORMAL

## 2024-06-14 PROCEDURE — 58100 BIOPSY OF UTERUS LINING: CPT

## 2024-06-14 PROCEDURE — G0145 SCR C/V CYTO,THINLAYER,RESCR: HCPCS

## 2024-06-14 PROCEDURE — 88305 TISSUE EXAM BY PATHOLOGIST: CPT | Performed by: PATHOLOGY

## 2024-06-14 PROCEDURE — 87491 CHLMYD TRACH DNA AMP PROBE: CPT

## 2024-06-14 PROCEDURE — 87591 N.GONORRHOEAE DNA AMP PROB: CPT

## 2024-06-14 PROCEDURE — 99459 PELVIC EXAMINATION: CPT

## 2024-06-14 PROCEDURE — 99204 OFFICE O/P NEW MOD 45 MIN: CPT | Mod: 25

## 2024-06-14 PROCEDURE — 87210 SMEAR WET MOUNT SALINE/INK: CPT

## 2024-06-14 PROCEDURE — 87624 HPV HI-RISK TYP POOLED RSLT: CPT

## 2024-06-14 RX ORDER — CLOBETASOL PROPIONATE 0.5 MG/G
OINTMENT TOPICAL 2 TIMES DAILY
Qty: 45 G | Refills: 3 | Status: SHIPPED | OUTPATIENT
Start: 2024-06-14

## 2024-06-14 RX ORDER — NORETHINDRONE ACETATE 5 MG
5 TABLET ORAL DAILY
Qty: 90 TABLET | Refills: 1 | Status: SHIPPED | OUTPATIENT
Start: 2024-06-14

## 2024-06-14 NOTE — PROGRESS NOTES
"Subjective:  Angela is a 44 year old   is here today with the following concerns:    AUB: started May 13- . Reports daily heavy bleeding. She was having regular, monthly cycles until last September. She was amenorrheic without any menopause s/sx from September to  when she had resumption of monthly menses. No fevers, abdominal pain, discharge, odor. Went into  for evaluation on 24. TSH and UA WNL at that time.  Lichen Sclerosus: she has hx of Lichen Sclerosus and noticed that when she was not having menses from -3/24 her LS improved significantly but has since become more irritating. She has triamcinolone cream she has applied to the area and states that it helps.       24 TVUS  \"CLINICAL HISTORY: Excessive and frequent menstruation.     TECHNIQUE: Transabdominal scans were performed. Endovaginal ultrasound  was performed to better visualize the adnexa.     COMPARISON: 5/3/2022     FINDINGS:     UTERUS: 12.9 x 5.8 x 6.2 cm. There is a 3.1 cm fibroid in the upper  anterior fundus.     ENDOMETRIUM: 8 mm. Normal smooth endometrium.     RIGHT OVARY: 1.9 x 1.7 x 1.7 cm. Normal size and appearance.     LEFT OVARY: 1.8 x 1.0 x 1.0 cm. Normal size and appearance.     No significant free fluid.                                                                      IMPRESSION:  Prominent endometrium is still within normal limits in thickness. No  evidence for mass.\"    ROS: Pertinent ROS as above.    Medical history  OB History    Para Term  AB Living   3 3 3 0 0 3   SAB IAB Ectopic Multiple Live Births   0 0 0 0 3      # Outcome Date GA Lbr Issac/2nd Weight Sex Type Anes PTL Lv   3 Term     M CS-Unspec   ALIS      Birth Comments: System Generated. Please review and update pregnancy details.   2 Term 08 39w0d   M CS-Unspec   ALIS   1 Term 2003 41w0d  5.075 kg (11 lb 3 oz) F CS-Unspec  N ALIS      Past Medical History:   Diagnosis Date    Degenerative disc disease, lumbar  " "   Fibromyalgia     Migraines       Past Surgical History:   Procedure Laterality Date     SECTION      x3    COMBINED ESOPHAGOSCOPY, GASTROSCOPY, DUODENOSCOPY (EGD) WITH CO2 INSUFFLATION N/A 2022    Procedure: ESOPHAGOGASTRODUODENOSCOPY, WITH CO2 INSUFFLATION;  Surgeon: Rambo Wong MD;  Location: [facility]    ESOPHAGOSCOPY, GASTROSCOPY, DUODENOSCOPY (EGD), COMBINED N/A 2022    Procedure: ESOPHAGOGASTRODUODENOSCOPY, WITH BIOPSY;  Surgeon: Rambo Wong MD;  Location: [facility]       ALL/Meds: Her medication and allergy histories were reviewed and are documented in their appropriate chart areas.    SH: Reviewed and documented in the appropriate area of the chart.  FH:  Her family history is reviewed and updated in the chart, today.  PMH: Her past medical, surgical, and obstetric histories were reviewed and updated today in the appropriate chart areas.    Objective:  PE: /86 (BP Location: Right arm, Cuff Size: Adult Regular)   Ht 1.626 m (5' 4\")   Wt 113.6 kg (250 lb 8 oz)   LMP 2024   BMI 43.00 kg/m    Body mass index is 43 kg/m .    Pertinent Physical exam findings:    GENERAL: Active, alert, in no acute distress.  SKIN: Clear. No significant rash, abnormal pigmentation or lesions  MS: no gross musculoskeletal defects noted, no edema  PSYCH: Age-appropriate alertness and orientation    Pelvic:       - Ext: Vulva and perineum are normal without lesion, mass or discharge        - Urethra: normal without discharge or scarring        - Bladder: no tenderness, no masses       - Vagina: without discharge and rugated       - Cervix: normal and multiparous       - Uterus: Normal shape, position and consistency       - Adnexa: Normal without masses or tenderness    Procedure: ENDOMETRIAL BIOPSY     Risks benefits and alternatives of the procedure were discussed with the patient. All questions were answered and consent was signed. A speculum was inserted with " visualization of the cervix. Cervix was cleaned with Betadine solution. The anterior lip of the cervix was grasped with a single-toothed tenaculum. The EMB Pipelle was inserted to the uterine fundus without difficulty and the uterus was sounded to 8 cm. The Pipelle was withdrawn as it was rotated 3-4 times to obtain the sample. 3 passes were made with appropriate sampling. Endometrial specimen was sent to pathology in formalin.  The biopsy was performed without complication.    The patient tolerated the procedure well.  We will contact her with her pathology result.    A/P:  Angela Crawford is a 44 year old  here today with the following concerns:  (N93.9) Abnormal uterine bleeding (AUB)  (primary encounter diagnosis)  Comment: We discussed various etiologies of abnormal bleeding such as menopause, infection, malignancy, polyp/fibroid. Based on presentation, rule out malignancy at this time and proceed with menstrual management based on patient desire. She is interested in hysterectomy but will consider daily POP for menstrual suppression at this time as well. Rx sent for POP if she desires to start taking. Follow up with physician colleague regarding surgical discussion.  Plan: Wet prep - Clinic Collect, Chlamydia         trachomatis PCR, Neisseria gonorrhoeae PCR,         Surgical Pathology Exam, ENDOMETRIAL BIOPSY W/O        CERVICAL DILATION, Pap Diagnostic with HPV,         norethindrone (AYGESTIN) 5 MG tablet          (L90.0) Lichen sclerosus  Comment: Discussed patient likely has lichen sclerosis (LS). Discussed chronic and progressive nature of LS and informed that true prevalence and etiology is unknown but suspect it is multifactorial with a combination of, but not limited to, genetics, immunologic abnormalities, and hormonal factors. Informed of risk of permanent damage to underlying tissue and that loss of architecture can be permanent and increased risk of malignancy, especially if left untreated.  Informed condition is not contagious and that she should self-evaluate monthly and inform me of any changes/concerns. Explained first-line treatment with topical steroid. Educated on risks/benefits and proper use and application of topical steroid. Pt was given opportunity to ask questions and have them answered. Pt is interested in proceeding with treatment at this time.   Plan: clobetasol (TEMOVATE) 0.05 % external ointment          She is agreeable to the plan above and has no further questions or concerns. She did not request a chaperone for the physical exam component of the visit today.     SHI Lam CNP

## 2024-06-15 LAB
C TRACH DNA SPEC QL NAA+PROBE: NEGATIVE
N GONORRHOEA DNA SPEC QL NAA+PROBE: NEGATIVE

## 2024-06-17 LAB
HPV HR 12 DNA CVX QL NAA+PROBE: NEGATIVE
HPV16 DNA CVX QL NAA+PROBE: NEGATIVE
HPV18 DNA CVX QL NAA+PROBE: NEGATIVE
HUMAN PAPILLOMA VIRUS FINAL DIAGNOSIS: NORMAL

## 2024-06-20 LAB
BKR LAB AP GYN ADEQUACY: NORMAL
BKR LAB AP GYN INTERPRETATION: NORMAL
BKR LAB AP PREVIOUS ABNORMAL: NORMAL
PATH REPORT.COMMENTS IMP SPEC: NORMAL
PATH REPORT.COMMENTS IMP SPEC: NORMAL
PATH REPORT.RELEVANT HX SPEC: NORMAL

## 2024-06-30 ENCOUNTER — HEALTH MAINTENANCE LETTER (OUTPATIENT)
Age: 44
End: 2024-06-30

## 2025-01-28 ENCOUNTER — MYC REFILL (OUTPATIENT)
Dept: FAMILY MEDICINE | Facility: OTHER | Age: 45
End: 2025-01-28
Payer: COMMERCIAL

## 2025-01-28 ENCOUNTER — MYC REFILL (OUTPATIENT)
Dept: OBGYN | Facility: CLINIC | Age: 45
End: 2025-01-28
Payer: COMMERCIAL

## 2025-01-28 DIAGNOSIS — L90.0 LICHEN SCLEROSUS: ICD-10-CM

## 2025-01-28 DIAGNOSIS — R06.2 WHEEZING: ICD-10-CM

## 2025-01-28 RX ORDER — CLOBETASOL PROPIONATE 0.5 MG/G
OINTMENT TOPICAL 2 TIMES DAILY
Qty: 45 G | Refills: 3 | Status: CANCELLED | OUTPATIENT
Start: 2025-01-28

## 2025-01-28 RX ORDER — ALBUTEROL SULFATE 90 UG/1
2 INHALANT RESPIRATORY (INHALATION) EVERY 4 HOURS PRN
Qty: 48 G | Refills: 0 | Status: SHIPPED | OUTPATIENT
Start: 2025-01-28

## 2025-01-28 NOTE — TELEPHONE ENCOUNTER
Requested Prescriptions   Pending Prescriptions Disp Refills    clobetasol (TEMOVATE) 0.05 % external ointment 45 g 3     Sig: Apply topically 2 times daily.       There is no refill protocol information for this order        Pt last seen 6/14/2024 for AUB    Last prescribed 6/14/2024 for 45g with 3 refills    RN called pharmacy and confirmed refills remain on file- RN notified pt.    Arpita Arboleda RN on 1/28/2025 at 9:22 AM

## 2025-05-11 ENCOUNTER — HEALTH MAINTENANCE LETTER (OUTPATIENT)
Age: 45
End: 2025-05-11

## 2025-07-13 ENCOUNTER — HEALTH MAINTENANCE LETTER (OUTPATIENT)
Age: 45
End: 2025-07-13

## (undated) RX ORDER — FENTANYL CITRATE 50 UG/ML
INJECTION, SOLUTION INTRAMUSCULAR; INTRAVENOUS
Status: DISPENSED
Start: 2022-05-26